# Patient Record
Sex: FEMALE | Race: WHITE | Employment: FULL TIME | URBAN - METROPOLITAN AREA
[De-identification: names, ages, dates, MRNs, and addresses within clinical notes are randomized per-mention and may not be internally consistent; named-entity substitution may affect disease eponyms.]

---

## 2017-03-10 ENCOUNTER — OFFICE VISIT (OUTPATIENT)
Dept: FAMILY MEDICINE | Facility: CLINIC | Age: 37
End: 2017-03-10
Payer: COMMERCIAL

## 2017-03-10 DIAGNOSIS — L40.9 PSORIASIS: Primary | ICD-10-CM

## 2017-03-10 PROCEDURE — 99213 OFFICE O/P EST LOW 20 MIN: CPT | Performed by: FAMILY MEDICINE

## 2017-03-10 RX ORDER — FLUOCINONIDE 0.5 MG/G
OINTMENT TOPICAL 2 TIMES DAILY
COMMUNITY
End: 2018-02-02

## 2017-03-10 RX ORDER — FLUOCINONIDE 0.5 MG/G
OINTMENT TOPICAL
Qty: 30 G | Refills: 1 | Status: SHIPPED | OUTPATIENT
Start: 2017-03-10 | End: 2018-02-02

## 2017-03-10 NOTE — PROGRESS NOTES
[facility] - PRIMARY CARE SKIN    CC : Psoriasis   SUBJECTIVE:                                                    Isaura Bhagat is a 36 year old female who presents to clinic today because of psoriasis on the elbows and knees. She denies any other areas of involvement. She denies any molluscum lesions today. Scaling and plaque formation are worse in the winter time.    Current treatment : Fluocinonide 0.05% ointment : a 30 gram tube has been suitable for satisfactory control over the last few months.  Response to treatment : Symptoms well controlled    Personal Medical History  Skin Cancer : NO  Eczema Psoriasis Rosacea Sensitive Skin Autoimmune   NO YES NO NO NO     Family Medical History  Skin Cancer : NO  Eczema Psoriasis Rosacea Sensitive Skin Autoimmune   NO NO NO NO NO       Patient Active Problem List   Diagnosis     Psoriasis     CARDIOVASCULAR SCREENING; LDL GOAL LESS THAN 160     Coagulopathy (H)     Rotator cuff tendonitis, right     Shoulder pain, right       Past Medical History   Diagnosis Date     Psoriasis 3/24/2010     Rotator cuff tendonitis, right 10/7/2016    Past Surgical History   Procedure Laterality Date     No history of surgery       Gyn surgery  11/72009     Csection     Laparoscopic tubal ligation Bilateral 1/30/2015     Procedure: LAPAROSCOPIC TUBAL LIGATION;  Surgeon: Reyes Poe MD;  Location:  OR      Social History   Substance Use Topics     Smoking status: Never Smoker     Smokeless tobacco: Never Used     Alcohol use Yes      Comment: occasionally    Family History     Problem (# of Occurrences) Relation (Name,Age of Onset)    Alzheimer Disease (1) Paternal Grandfather    C.A.D. (1) Maternal Grandfather    CEREBROVASCULAR DISEASE (1) Paternal Grandfather    Cardiovascular (1) Maternal Grandfather    Eye Disorder (1) Mother    GASTROINTESTINAL DISEASE (3) Mother, Father, Brother    HEART DISEASE (1) Maternal Grandfather    Hypertension (2) Father, Maternal  "Grandfather    Lipids (3) Maternal Grandfather, Paternal Grandmother, Paternal Grandfather    Obesity (1) Maternal Grandmother    Respiratory (1) Maternal Grandmother           Prescription Medications as of 3/10/2017             fluocinonide (LIDEX) 0.05 % ointment Apply topically 2 times daily    fluocinonide (LIDEX) 0.05 % ointment Apply to affected areas on elbows and knees when needed    drospirenone-ethinyl estradiol (YAQUELIN) 3-0.02 MG per tablet Take 1 tablet by mouth daily            Allergies   Allergen Reactions     Nkda [No Known Drug Allergies]         INTEGUMENTARY/SKIN: POSITIVE for scaling  ROS : 14 point review of systems was negative except the symptoms listed above in the HPI.    This document serves as a record of the services and decisions personally performed and made by Jennifer Watson MD. It was created on her behalf by Mike Enamorado, a trained medical scribe.  The creation of this document is based on the scribe's personal observations and the provider's statements to the medical scribe.  Mike Enamorado, March 10, 2017 3:41 PM      OBJECTIVE:                                                    GENERAL: healthy, alert and no distress  SKIN: Cronin Skin Type - II.  Face, Neck and Arms were examined. The dermatoscope was used to help evaluate pigmented lesions.  Skin Pertinent Findings:  Left and right elbow : 1 cm, slightly erythematous, light scaly plaque.    Diagnostic Test Results:  none           ASSESSMENT:                                                      Encounter Diagnosis   Name Primary?     Psoriasis Yes         PLAN:                                                    Patient Instructions   FUTURE APPOINTMENTS  Follow up in 1 year(s) for a re-evaluation of psoriasis.    TOPICAL STEROID INSTRUCTIONS  Fluocinonide 0.05% ointment.    Apply an amount equal to half of a fingertip (0.25 g) to the affected area(s) on the elbows and knees, two times per day for 3-5 days when needed.    \"Fingertip " "Amount\"      Not to be used on face or groin.      After initial treatment, topical steroid may be used as needed for flare-ups. However, do not use for more than 7 consecutive days.     Topical steroid use is for short-term treatment only. If after initial treatment, you are using this for prolonged periods of time, return to clinic for re-evaluation of treatment.    Keep in mind to also regularly use moisturizer, as this preventative measure can help maintain your skin's natural moisture barrier.        PROCEDURES:                                                    None.    TT : 20 minutes.  CT : 15 minutes. Discussion regarding etiology, spectrum of psoriasis, treatment options, aggravating factors.      The information in this document, created by the medical scribe for me, accurately reflects the services I personally performed and the decisions made by me. I have reviewed and approved this document for accuracy prior to leaving the patient care area.  Jennifer Watson MD March 10, 2017 3:41 PM  [facility] - PRIMARY CARE SKIN  "

## 2017-03-10 NOTE — MR AVS SNAPSHOT
"              After Visit Summary   3/10/2017    Isaura Bhagat    MRN: [id number]           Patient Information     Date Of Birth          1980        Visit Information        Provider Department      3/10/2017 3:40 PM Wandy Watson MD [facility] - Primary Care Skin        Today's Diagnoses     Psoriasis    -  1      Care Instructions    FUTURE APPOINTMENTS  Follow up in 1 year(s) for a re-evaluation of psoriasis.    TOPICAL STEROID INSTRUCTIONS  Fluocinonide 0.05% ointment.    Apply an amount equal to half of a fingertip (0.25 g) to the affected area(s) on the elbows and knees, two times per day for 3-5 days when needed.    \"Fingertip Amount\"      Not to be used on face or groin.      After initial treatment, topical steroid may be used as needed for flare-ups. However, do not use for more than 7 consecutive days.     Topical steroid use is for short-term treatment only. If after initial treatment, you are using this for prolonged periods of time, return to clinic for re-evaluation of treatment.    Keep in mind to also regularly use moisturizer, as this preventative measure can help maintain your skin's natural moisture barrier.        Follow-ups after your visit        Who to contact     If you have questions or need follow up information about today's clinic visit or your schedule please contact [facility] - PRIMARY CARE SKIN directly at [phone].  Normal or non-critical lab and imaging results will be communicated to you by MyChart, letter or phone within 4 business days after the clinic has received the results. If you do not hear from us within 7 days, please contact the clinic through MyChart or phone. If you have a critical or abnormal lab result, we will notify you by phone as soon as possible.  Submit refill requests through [employer] or call your pharmacy and they will forward the refill request to us. Please allow 3 business days for your refill to be completed.    " "      Additional Information About Your Visit        MyChart Information     [employer] lets you send messages to your doctor, view your test results, renew your prescriptions, schedule appointments and more. To sign up, go to [email or web address] . Click on \"Log in\" on the left side of the screen, which will take you to the Welcome page. Then click on \"Sign up Now\" on the right side of the page.     You will be asked to enter the access code listed below, as well as some personal information. Please follow the directions to create your username and password.     Your access code is: [email or web address]  Expires: 2017  3:44 PM     Your access code will  in 90 days. If you need help or a new code, please call your [facility] clinic or [phone].        Care EveryWhere ID     This is your Care EveryWhere ID. This could be used by other organizations to access your [facility] medical records  [id number]         Blood Pressure from Last 3 Encounters:   10/04/16 105/66   05/18/15 108/72   01/30/15 106/63    Weight from Last 3 Encounters:   10/04/16 119 lb (54 kg)   05/18/15 119 lb 4.8 oz (54.1 kg)   01/30/15 116 lb 3.2 oz (52.7 kg)              Today, you had the following     No orders found for display         Today's Medication Changes          These changes are accurate as of: 3/10/17  3:44 PM.  If you have any questions, ask your nurse or doctor.               These medicines have changed or have updated prescriptions.        Dose/Directions    * fluocinonide 0.05 % ointment   Commonly known as:  LIDEX   This may have changed:  Another medication with the same name was added. Make sure you understand how and when to take each.        Apply topically 2 times daily   Refills:  0       * fluocinonide 0.05 % ointment   Commonly known as:  LIDEX   This may have changed:  You were already taking a medication with the same name, and this prescription was added. Make sure you understand how and when to take each.   Used " for:  Psoriasis        Apply to affected areas on elbows and knees when needed   Quantity:  30 g   Refills:  1       * Notice:  This list has 2 medication(s) that are the same as other medications prescribed for you. Read the directions carefully, and ask your doctor or other care provider to review them with you.         Where to get your medicines      These medications were sent to [place] [address] [facility] - [place], [place] - [address] [facility]  [address] [address]     Phone:  [phone]     fluocinonide 0.05 % ointment                Primary Care Provider Office Phone # Fax #    Kisha Guthrie [phone]       [place] [address] [address] [address]        Thank you!     Thank you for choosing [facility] - PRIMARY CARE SKIN  for your care. Our goal is always to provide you with excellent care. Hearing back from our patients is one way we can continue to improve our services. Please take a few minutes to complete the written survey that you may receive in the mail after your visit with us. Thank you!             Your Updated Medication List - Protect others around you: Learn how to safely use, store and throw away your medicines at www.disposemymeds.org.          This list is accurate as of: 3/10/17  3:44 PM.  Always use your most recent med list.                   Brand Name Dispense Instructions for use    drospirenone-ethinyl estradiol 3-0.02 MG per tablet    YAQUELIN     Take 1 tablet by mouth daily       * fluocinonide 0.05 % ointment    LIDEX     Apply topically 2 times daily       * fluocinonide 0.05 % ointment    LIDEX    30 g    Apply to affected areas on elbows and knees when needed       * Notice:  This list has 2 medication(s) that are the same as other medications prescribed for you. Read the directions carefully, and ask your doctor or other care provider to review them with you.

## 2017-03-10 NOTE — PATIENT INSTRUCTIONS
"FUTURE APPOINTMENTS  Follow up in 1 year(s) for a re-evaluation of psoriasis.    TOPICAL STEROID INSTRUCTIONS  Fluocinonide 0.05% ointment.    Apply an amount equal to half of a fingertip (0.25 g) to the affected area(s) on the elbows and knees, two times per day for 3-5 days when needed.    \"Fingertip Amount\"      Not to be used on face or groin.      After initial treatment, topical steroid may be used as needed for flare-ups. However, do not use for more than 7 consecutive days.     Topical steroid use is for short-term treatment only. If after initial treatment, you are using this for prolonged periods of time, return to clinic for re-evaluation of treatment.    Keep in mind to also regularly use moisturizer, as this preventative measure can help maintain your skin's natural moisture barrier.  "

## 2018-02-02 ENCOUNTER — OFFICE VISIT (OUTPATIENT)
Dept: FAMILY MEDICINE | Facility: CLINIC | Age: 38
End: 2018-02-02
Payer: COMMERCIAL

## 2018-02-02 DIAGNOSIS — M25.50 ARTHRALGIA, UNSPECIFIED JOINT: ICD-10-CM

## 2018-02-02 DIAGNOSIS — L40.0 PLAQUE PSORIASIS: Primary | ICD-10-CM

## 2018-02-02 LAB
BASOPHILS # BLD AUTO: 0 10E9/L (ref 0–0.2)
BASOPHILS NFR BLD AUTO: 0.4 %
CRP SERPL-MCNC: <2.9 MG/L (ref 0–8)
DIFFERENTIAL METHOD BLD: NORMAL
EOSINOPHIL # BLD AUTO: 0.2 10E9/L (ref 0–0.7)
EOSINOPHIL NFR BLD AUTO: 2.6 %
ERYTHROCYTE [DISTWIDTH] IN BLOOD BY AUTOMATED COUNT: 13.2 % (ref 10–15)
HCT VFR BLD AUTO: 42.7 % (ref 35–47)
HGB BLD-MCNC: 14.3 G/DL (ref 11.7–15.7)
LYMPHOCYTES # BLD AUTO: 1.5 10E9/L (ref 0.8–5.3)
LYMPHOCYTES NFR BLD AUTO: 22.5 %
MCH RBC QN AUTO: 29 PG (ref 26.5–33)
MCHC RBC AUTO-ENTMCNC: 33.5 G/DL (ref 31.5–36.5)
MCV RBC AUTO: 87 FL (ref 78–100)
MONOCYTES # BLD AUTO: 0.4 10E9/L (ref 0–1.3)
MONOCYTES NFR BLD AUTO: 6 %
NEUTROPHILS # BLD AUTO: 4.7 10E9/L (ref 1.6–8.3)
NEUTROPHILS NFR BLD AUTO: 68.5 %
PLATELET # BLD AUTO: 267 10E9/L (ref 150–450)
RBC # BLD AUTO: 4.93 10E12/L (ref 3.8–5.2)
WBC # BLD AUTO: 6.8 10E9/L (ref 4–11)

## 2018-02-02 PROCEDURE — 86038 ANTINUCLEAR ANTIBODIES: CPT | Performed by: FAMILY MEDICINE

## 2018-02-02 PROCEDURE — 86140 C-REACTIVE PROTEIN: CPT | Performed by: FAMILY MEDICINE

## 2018-02-02 PROCEDURE — 99214 OFFICE O/P EST MOD 30 MIN: CPT | Performed by: FAMILY MEDICINE

## 2018-02-02 PROCEDURE — 86431 RHEUMATOID FACTOR QUANT: CPT | Performed by: FAMILY MEDICINE

## 2018-02-02 PROCEDURE — 36415 COLL VENOUS BLD VENIPUNCTURE: CPT | Performed by: FAMILY MEDICINE

## 2018-02-02 PROCEDURE — 85025 COMPLETE CBC W/AUTO DIFF WBC: CPT | Performed by: FAMILY MEDICINE

## 2018-02-02 PROCEDURE — 80053 COMPREHEN METABOLIC PANEL: CPT | Performed by: FAMILY MEDICINE

## 2018-02-02 RX ORDER — BETAMETHASONE DIPROPIONATE 0.5 MG/G
OINTMENT, AUGMENTED TOPICAL 2 TIMES DAILY
Qty: 50 G | Refills: 1 | Status: SHIPPED | OUTPATIENT
Start: 2018-02-02 | End: 2020-01-07

## 2018-02-02 RX ORDER — CALCIPOTRIENE 50 UG/G
OINTMENT TOPICAL
Qty: 100 G | Refills: 3 | Status: SHIPPED | OUTPATIENT
Start: 2018-02-02 | End: 2019-03-12

## 2018-02-02 RX ORDER — CYCLOSPORINE 0.5 MG/ML
EMULSION OPHTHALMIC
Refills: 3 | COMMUNITY
Start: 2018-01-05 | End: 2019-03-12

## 2018-02-02 RX ORDER — NORETHINDRONE ACETATE/ETHINYL ESTRADIOL AND FERROUS FUMARATE 1.5-30(21)
KIT ORAL DAILY
COMMUNITY
Start: 2018-01-13 | End: 2019-08-07

## 2018-02-02 NOTE — MR AVS SNAPSHOT
"              After Visit Summary   2/2/2018    Isaura Bhagat    MRN: [id number]           Patient Information     Date Of Birth          1980        Visit Information        Provider Department      2/2/2018 10:40 AM Wandy Watson MD [facility] [place]        Today's Diagnoses     Plaque psoriasis    -  1    Arthralgia, unspecified joint          Care Instructions    FUTURE APPOINTMENTS    Follow up in 3 month(s).    Follow up with rheumatology for evaluation of arthralgias.    TOPICAL STEROID INSTRUCTIONS  Augmented betamethasone dipropionate 0.05% ointment.    Apply an amount equal to half of a fingertip (0.25 g) to the affected area(s) on the knees and elbows, one time per day alternating for one week on, then pause application for one week.    \"Fingertip Amount\"      Not to be used on face or groin.    Topical steroid use is for short-term treatment only. If after initial treatment, you are using this for prolonged periods of time, return to clinic for re-evaluation of treatment.    Keep in mind to also regularly use moisturizer, as this preventative measure can help maintain your skin's natural moisture barrier.    TOPICAL MEDICATION INSTRUCTIONS  Calcipotriene 0.005% ointment    Apply a thin layer to the affected area(s) on the knees and elbows 1-2 times per day for every day.    This is not a steroid.    Keep in mind to also regularly use moisturizer, as this preventative measure can help maintain your skin's natural moisture barrier.          Follow-ups after your visit        Who to contact     If you have questions or need follow up information about today's clinic visit or your schedule please contact [facility] [place] directly at [phone].  Normal or non-critical lab and imaging results will be communicated to you by MyChart, letter or phone within 4 business days after the clinic has received the results. If you do not hear from us within 7 days, " "please contact the clinic through [employer] or phone. If you have a critical or abnormal lab result, we will notify you by phone as soon as possible.  Submit refill requests through [employer] or call your pharmacy and they will forward the refill request to us. Please allow 3 business days for your refill to be completed.          Additional Information About Your Visit        [employer] Information     [employer] lets you send messages to your doctor, view your test results, renew your prescriptions, schedule appointments and more. To sign up, go to [email or web address] . Click on \"Log in\" on the left side of the screen, which will take you to the Welcome page. Then click on \"Sign up Now\" on the right side of the page.     You will be asked to enter the access code listed below, as well as some personal information. Please follow the directions to create your username and password.     Your access code is: [email or web address]  Expires: 5/3/2018 10:49 AM     Your access code will  in 90 days. If you need help or a new code, please call your [facility] clinic or [phone].        Care EveryWhere ID     This is your Care EveryWhere ID. This could be used by other organizations to access your [facility] medical records  [id number]         Blood Pressure from Last 3 Encounters:   10/04/16 105/66   05/18/15 108/72   01/30/15 106/63    Weight from Last 3 Encounters:   10/04/16 119 lb (54 kg)   05/18/15 119 lb 4.8 oz (54.1 kg)   01/30/15 116 lb 3.2 oz (52.7 kg)              Today, you had the following     No orders found for display         Today's Medication Changes          These changes are accurate as of 18 10:49 AM.  If you have any questions, ask your nurse or doctor.               Stop taking these medicines if you haven't already. Please contact your care team if you have questions.     fluocinonide 0.05 % ointment   Commonly known as:  LIDEX   Stopped by:  Wandy Watson MD                    Primary " Care Provider    [facility] MD [place]       No address on file        Equal Access to Services     KASHIFJER IRVIN : Hadii aad ku hadsandraalfonso Holley, wacolemoon godoy, landylance acevesmarama fitzgerald. So [facility] [phone].    ATENCIÓN: Si habla español, tiene a lomeli disposición servicios gratuitos de asistencia lingüística. Llame al [phone].    We comply with applicable federal civil rights laws and [place] laws. We do not discriminate on the basis of race, color, national origin, age, disability, sex, sexual orientation, or gender identity.            Thank you!     Thank you for choosing [facility] [place]  for your care. Our goal is always to provide you with excellent care. Hearing back from our patients is one way we can continue to improve our services. Please take a few minutes to complete the written survey that you may receive in the mail after your visit with us. Thank you!             Your Updated Medication List - Protect others around you: Learn how to safely use, store and throw away your medicines at www.disposemymeds.org.          This list is accurate as of 2/2/18 10:49 AM.  Always use your most recent med list.                   Brand Name Dispense Instructions for use Diagnosis    RIVERA FE 1.5/30 1.5-30 MG-MCG per tablet   Generic drug:  norethindrone-ethinyl estradiol-iron      Take by mouth daily        RESTASIS 0.05 % ophthalmic emulsion   Generic drug:  cycloSPORINE      INSTILL ONE DROP INTO EACH EYE EVERY 12 HOURS

## 2018-02-02 NOTE — PROGRESS NOTES
[facility] - PRIMARY CARE SKIN    CC : Psoriasis   SUBJECTIVE:                                                    Isaura Bhagat is a 37 year old female who presents to clinic today for follow-up of psoriasis on the elbows and left knee. Scaling and itchiness have continued on the left knee. Itchiness has intensified, and she is waking at night due to itchiness. The affected area may be enlarging and she is concerned about possible skin atrophy.     She is also beginning to experience scaling and itchiness symptoms on the right knee. Elbows are well controlled at this time. She denies other areas of involvement.    She is concerned about the use of high compression socks ending at the knee as an aggravating factor. Scaling and itchiness continue to be worse in the winter.    Left knee pain and bilateral knuckle pain in beginning in winter 2017 has persisted since Nov 2017. She denies swelling of knuckles, with the exception of the left third finger. Morning stiffness has been a persistent issue as well, typically resolving after 1 hour.      Current treatment : Fluocinonide 0.05% ointment used a couple of times a week.  Response to treatment : Control of symptoms has diminished,.    Personal Medical History  Skin Cancer : NO  Eczema Psoriasis Rosacea Sensitive Skin Autoimmune   NO YES NO NO NO     Family Medical History  Skin Cancer : NO  Eczema Psoriasis Rosacea Sensitive Skin Autoimmune   NO NO NO NO NO   Arthritis : YES.    Patient Active Problem List   Diagnosis     Psoriasis     CARDIOVASCULAR SCREENING; LDL GOAL LESS THAN 160     Coagulopathy (H)     Rotator cuff tendonitis, right     Shoulder pain, right       Past Medical History:   Diagnosis Date     Psoriasis 3/24/2010     Rotator cuff tendonitis, right 10/7/2016    Past Surgical History:   Procedure Laterality Date     GYN SURGERY  11/72009    Csection     LAPAROSCOPIC TUBAL LIGATION Bilateral 1/30/2015    Procedure: LAPAROSCOPIC TUBAL  LIGATION;  Surgeon: Reyes Poe MD;  Location: [facility] OR     NO HISTORY OF SURGERY        Social History   Substance Use Topics     Smoking status: Never Smoker     Smokeless tobacco: Never Used     Alcohol use Yes      Comment: occasionally    Family History     Problem (# of Occurrences) Relation (Name,Age of Onset)    Alzheimer Disease (1) Paternal Grandfather    C.A.D. (1) Maternal Grandfather    CEREBROVASCULAR DISEASE (1) Paternal Grandfather    Cardiovascular (1) Maternal Grandfather    Eye Disorder (1) Mother    GASTROINTESTINAL DISEASE (3) Mother, Father, Brother    HEART DISEASE (1) Maternal Grandfather    Hypertension (2) Father, Maternal Grandfather    Lipids (3) Maternal Grandfather, Paternal Grandmother, Paternal Grandfather    Obesity (1) Maternal Grandmother    Respiratory (1) Maternal Grandmother           Prescription Medications as of 2/2/2018             RIVERA FE 1.5/30 1.5-30 MG-MCG per tablet Take by mouth daily    RESTASIS 0.05 % ophthalmic emulsion INSTILL ONE DROP INTO EACH EYE EVERY 12 HOURS            Allergies   Allergen Reactions     Nkda [No Known Drug Allergies]         INTEGUMENTARY/SKIN: POSITIVE for pruritus. NEGATIVE for scaling.  MUSCULOSKELETAL: POSITIVE  for arthralgias  ROS : 14 point review of systems was negative except the symptoms listed above in the HPI.    This document serves as a record of the services and decisions personally performed and made by Jennifer Watson MD. It was created on her behalf by Mike Enamorado, a trained medical scribe.  The creation of this document is based on the scribe's personal observations and the provider's statements to the medical scribe.  Mike Enamorado, February 2, 2018 10:32 AM      OBJECTIVE:                                                    GENERAL: healthy, alert and no distress  SKIN: Cronin Skin Type - II.  Arms, legs, fingers were examined. The dermatoscope was used to help evaluate pigmented lesions.  Skin Pertinent Findings:  Left  "knee : 5 x 2 cm erythematous plaque with no scaling. No skin atrophy.    Right anterior knee : 1 cm plaque    Elbows : erythematous plaques    Fingernails : No pitting.    Diagnostic Test Results:  No results found for this or any previous visit (from the past 24 hour(s)).    MDM : because of the new arthralgias recommended rheumatology consult regarding psoriatic arthritis    ASSESSMENT:                                                      Encounter Diagnoses   Name Primary?     Plaque psoriasis Yes     Arthralgia, unspecified joint          PLAN:                                                    Patient Instructions   FUTURE APPOINTMENTS    Follow up in 3 month(s).    Follow up with rheumatology for evaluation of arthralgias.    TOPICAL STEROID INSTRUCTIONS  Augmented betamethasone dipropionate 0.05% ointment.    Apply an amount equal to half of a fingertip (0.25 g) to the affected area(s) on the knees and elbows, one time per day alternating for one week on, then pause application for one week.    \"Fingertip Amount\"      Not to be used on face or groin.    Topical steroid use is for short-term treatment only. If after initial treatment, you are using this for prolonged periods of time, return to clinic for re-evaluation of treatment.    Keep in mind to also regularly use moisturizer, as this preventative measure can help maintain your skin's natural moisture barrier.    TOPICAL MEDICATION INSTRUCTIONS  Calcipotriene 0.005% ointment    Apply a thin layer to the affected area(s) on the knees and elbows 1-2 times per day for every day.    This is not a steroid.    Keep in mind to also regularly use moisturizer, as this preventative measure can help maintain your skin's natural moisture barrier.        PROCEDURES:                                                    None.    TT : 20 minutes.  CT : 15 minutes. Discussion regarding etiology, spectrum of psoriasis, treatment options, aggravating factors.      The " information in this document, created by the medical scribe for me, accurately reflects the services I personally performed and the decisions made by me. I have reviewed and approved this document for accuracy prior to leaving the patient care area.  Jennifer Watson MD February 2, 2018 10:32 AM  [facility] - PRIMARY CARE SKIN

## 2018-02-02 NOTE — PATIENT INSTRUCTIONS
"FUTURE APPOINTMENTS    Follow up in 3 month(s).    Follow up with rheumatology for evaluation of arthralgias.    TOPICAL STEROID INSTRUCTIONS  Augmented betamethasone dipropionate 0.05% ointment.    Apply an amount equal to half of a fingertip (0.25 g) to the affected area(s) on the knees and elbows, one time per day alternating for one week on, then pause application for one week.    \"Fingertip Amount\"      Not to be used on face or groin.    Topical steroid use is for short-term treatment only. If after initial treatment, you are using this for prolonged periods of time, return to clinic for re-evaluation of treatment.    Keep in mind to also regularly use moisturizer, as this preventative measure can help maintain your skin's natural moisture barrier.    TOPICAL MEDICATION INSTRUCTIONS  Calcipotriene 0.005% ointment    Apply a thin layer to the affected area(s) on the knees and elbows 1-2 times per day for every day.    This is not a steroid.    Keep in mind to also regularly use moisturizer, as this preventative measure can help maintain your skin's natural moisture barrier.  "

## 2018-02-02 NOTE — Clinical Note
2/2/2018         RE: Isaura Bhagat  [address]  [place] [address]        Dear Colleague,    Thank you for referring your patient, Isaura Bhagat, to the [facility]. Please see a copy of my visit note below.    [facility] - PRIMARY CARE SKIN    CC : Psoriasis   SUBJECTIVE:                                                    Isaura Bhagat is a 37 year old female who presents to clinic today for follow-up of psoriasis on the elbows and left knee. Scaling and itchiness have continued on the left knee. Itchiness has intensified, and she is waking at night due to itchiness. The affected area may be enlarging and she is concerned about possible skin atrophy.     She is also beginning to experience scaling and itchiness symptoms on the right knee. Elbows are well controlled at this time. She denies other areas of involvement.    She is concerned about the use of high compression socks ending at the knee as an aggravating factor. Scaling and itchiness continue to be worse in the winter.    Left knee pain and bilateral knuckle pain in beginning in winter 2017 has persisted since Nov 2017. She denies swelling of knuckles, with the exception of the left third finger. Morning stiffness has been a persistent issue as well, typically resolving after 1 hour.      Current treatment : Fluocinonide 0.05% ointment used a couple of times a week.  Response to treatment : Control of symptoms has diminished,.    Personal Medical History  Skin Cancer : NO  Eczema Psoriasis Rosacea Sensitive Skin Autoimmune   NO YES NO NO NO     Family Medical History  Skin Cancer : NO  Eczema Psoriasis Rosacea Sensitive Skin Autoimmune   NO NO NO NO NO   Arthritis : YES.    Patient Active Problem List   Diagnosis     Psoriasis     CARDIOVASCULAR SCREENING; LDL GOAL LESS THAN 160     Coagulopathy (H)     Rotator cuff tendonitis, right     Shoulder pain, right       Past Medical History:   Diagnosis Date      Psoriasis 3/24/2010     Rotator cuff tendonitis, right 10/7/2016    Past Surgical History:   Procedure Laterality Date     GYN SURGERY  11/72009    Csection     LAPAROSCOPIC TUBAL LIGATION Bilateral 1/30/2015    Procedure: LAPAROSCOPIC TUBAL LIGATION;  Surgeon: Reyes Poe MD;  Location: [facility] OR     NO HISTORY OF SURGERY        Social History   Substance Use Topics     Smoking status: Never Smoker     Smokeless tobacco: Never Used     Alcohol use Yes      Comment: occasionally    Family History     Problem (# of Occurrences) Relation (Name,Age of Onset)    Alzheimer Disease (1) Paternal Grandfather    C.A.D. (1) Maternal Grandfather    CEREBROVASCULAR DISEASE (1) Paternal Grandfather    Cardiovascular (1) Maternal Grandfather    Eye Disorder (1) Mother    GASTROINTESTINAL DISEASE (3) Mother, Father, Brother    HEART DISEASE (1) Maternal Grandfather    Hypertension (2) Father, Maternal Grandfather    Lipids (3) Maternal Grandfather, Paternal Grandmother, Paternal Grandfather    Obesity (1) Maternal Grandmother    Respiratory (1) Maternal Grandmother           Prescription Medications as of 2/2/2018             RIVERA FE 1.5/30 1.5-30 MG-MCG per tablet Take by mouth daily    RESTASIS 0.05 % ophthalmic emulsion INSTILL ONE DROP INTO EACH EYE EVERY 12 HOURS            Allergies   Allergen Reactions     Nkda [No Known Drug Allergies]         INTEGUMENTARY/SKIN: POSITIVE for pruritus. NEGATIVE for scaling.  MUSCULOSKELETAL: POSITIVE  for arthralgias  ROS : 14 point review of systems was negative except the symptoms listed above in the HPI.    This document serves as a record of the services and decisions personally performed and made by Jennifer Watson MD. It was created on her behalf by Mike Enamorado, a trained medical scribe.  The creation of this document is based on the scribe's personal observations and the provider's statements to the medical scribe.  Mike Enamorado, February 2, 2018 10:32 AM      OBJECTIVE:            "                                         GENERAL: healthy, alert and no distress  SKIN: Cronin Skin Type - II.  Arms, legs, fingers were examined. The dermatoscope was used to help evaluate pigmented lesions.  Skin Pertinent Findings:  Left knee : 5 x 2 cm erythematous plaque with no scaling. No skin atrophy.    Right anterior knee : 1 cm plaque    Elbows : erythematous plaques    Fingernails : No pitting.    Diagnostic Test Results:  No results found for this or any previous visit (from the past 24 hour(s)).    MDM : because of the new arthralgias recommended rheumatology consult regarding psoriatic arthritis    ASSESSMENT:                                                      Encounter Diagnoses   Name Primary?     Plaque psoriasis Yes     Arthralgia, unspecified joint          PLAN:                                                    Patient Instructions   FUTURE APPOINTMENTS    Follow up in 3 month(s).    Follow up with rheumatology for evaluation of arthralgias.    TOPICAL STEROID INSTRUCTIONS  Augmented betamethasone dipropionate 0.05% ointment.    Apply an amount equal to half of a fingertip (0.25 g) to the affected area(s) on the knees and elbows, one time per day alternating for one week on, then pause application for one week.    \"Fingertip Amount\"      Not to be used on face or groin.    Topical steroid use is for short-term treatment only. If after initial treatment, you are using this for prolonged periods of time, return to clinic for re-evaluation of treatment.    Keep in mind to also regularly use moisturizer, as this preventative measure can help maintain your skin's natural moisture barrier.    TOPICAL MEDICATION INSTRUCTIONS  Calcipotriene 0.005% ointment    Apply a thin layer to the affected area(s) on the knees and elbows 1-2 times per day for every day.    This is not a steroid.    Keep in mind to also regularly use moisturizer, as this preventative measure can help maintain your skin's " natural moisture barrier.        PROCEDURES:                                                    None.    TT : 20 minutes.  CT : 15 minutes. Discussion regarding etiology, spectrum of psoriasis, treatment options, aggravating factors.      The information in this document, created by the medical scribe for me, accurately reflects the services I personally performed and the decisions made by me. I have reviewed and approved this document for accuracy prior to leaving the patient care area.  Jennifer Watson MD February 2, 2018 10:32 AM  [facility] - PRIMARY CARE SKIN    Again, thank you for allowing me to participate in the care of your patient.        Sincerely,        Wandy Watson MD

## 2018-02-03 LAB
ALBUMIN SERPL-MCNC: 3.8 G/DL (ref 3.4–5)
ALP SERPL-CCNC: 34 U/L (ref 40–150)
ALT SERPL W P-5'-P-CCNC: 17 U/L (ref 0–50)
ANION GAP SERPL CALCULATED.3IONS-SCNC: 6 MMOL/L (ref 3–14)
AST SERPL W P-5'-P-CCNC: 19 U/L (ref 0–45)
BILIRUB SERPL-MCNC: 0.6 MG/DL (ref 0.2–1.3)
BUN SERPL-MCNC: 12 MG/DL (ref 7–30)
CALCIUM SERPL-MCNC: 8.8 MG/DL (ref 8.5–10.1)
CHLORIDE SERPL-SCNC: 104 MMOL/L (ref 94–109)
CO2 SERPL-SCNC: 28 MMOL/L (ref 20–32)
CREAT SERPL-MCNC: 0.75 MG/DL (ref 0.52–1.04)
GFR SERPL CREATININE-BSD FRML MDRD: 86 ML/MIN/1.7M2
GLUCOSE SERPL-MCNC: 99 MG/DL (ref 70–99)
POTASSIUM SERPL-SCNC: 3.6 MMOL/L (ref 3.4–5.3)
PROT SERPL-MCNC: 7 G/DL (ref 6.8–8.8)
SODIUM SERPL-SCNC: 138 MMOL/L (ref 133–144)

## 2018-02-04 LAB — RHEUMATOID FACT SER NEPH-ACNC: <20 IU/ML (ref 0–20)

## 2018-02-05 LAB — ANA SER QL IF: NEGATIVE

## 2018-03-04 ENCOUNTER — HEALTH MAINTENANCE LETTER (OUTPATIENT)
Age: 38
End: 2018-03-04

## 2018-10-01 ENCOUNTER — TRANSFERRED RECORDS (OUTPATIENT)
Dept: HEALTH INFORMATION MANAGEMENT | Facility: CLINIC | Age: 38
End: 2018-10-01

## 2018-10-01 LAB — PAP SMEAR - HIM PATIENT REPORTED: NEGATIVE

## 2019-03-12 ENCOUNTER — OFFICE VISIT (OUTPATIENT)
Dept: FAMILY MEDICINE | Facility: CLINIC | Age: 39
End: 2019-03-12
Payer: COMMERCIAL

## 2019-03-12 VITALS
TEMPERATURE: 98.1 F | DIASTOLIC BLOOD PRESSURE: 72 MMHG | SYSTOLIC BLOOD PRESSURE: 100 MMHG | HEIGHT: 62 IN | OXYGEN SATURATION: 99 % | HEART RATE: 91 BPM | WEIGHT: 117 LBS | BODY MASS INDEX: 21.53 KG/M2

## 2019-03-12 DIAGNOSIS — H93.8X3 EAR FULLNESS, BILATERAL: Primary | ICD-10-CM

## 2019-03-12 PROCEDURE — 99213 OFFICE O/P EST LOW 20 MIN: CPT | Performed by: INTERNAL MEDICINE

## 2019-03-12 RX ORDER — ERGOCALCIFEROL (VITAMIN D2) 10 MCG
TABLET ORAL
COMMUNITY
End: 2019-08-07

## 2019-03-12 ASSESSMENT — MIFFLIN-ST. JEOR: SCORE: 1155.96

## 2019-03-12 NOTE — PROGRESS NOTES
SUBJECTIVE:   Isaura Bhagat is a 38 year old female who presents to clinic today for the following health issues:      Concern - ear fullness   Onset:  About one month     Description:    pt is having ear fullness     Intensity: mild    Progression of Symptoms:  worsening    Accompanying Signs & Symptoms:  Hearing concerns     Previous history of similar problem:   Yes   Therapies Tried and outcome: debrox       Problem list and histories reviewed & adjusted, as indicated.  Additional history: as documented    Patient Active Problem List   Diagnosis     Psoriasis     CARDIOVASCULAR SCREENING; LDL GOAL LESS THAN 160     Coagulopathy (H)     Rotator cuff tendonitis, right     Shoulder pain, right     Past Surgical History:   Procedure Laterality Date     GYN SURGERY  11/72009    Csection     LAPAROSCOPIC TUBAL LIGATION Bilateral 1/30/2015    Procedure: LAPAROSCOPIC TUBAL LIGATION;  Surgeon: Reyes Poe MD;  Location: [facility] OR     NO HISTORY OF SURGERY         Social History     Tobacco Use     Smoking status: Never Smoker     Smokeless tobacco: Never Used   Substance Use Topics     Alcohol use: Yes     Comment: occasionally     Family History   Problem Relation Age of Onset     Eye Disorder Mother      Gastrointestinal Disease Mother      Hypertension Father      Gastrointestinal Disease Father      Obesity Maternal Grandmother      Respiratory Maternal Grandmother      C.A.D. Maternal Grandfather      Hypertension Maternal Grandfather      Cardiovascular Maternal Grandfather      Heart Disease Maternal Grandfather      Lipids Maternal Grandfather      Lipids Paternal Grandmother      Cerebrovascular Disease Paternal Grandfather      Alzheimer Disease Paternal Grandfather      Lipids Paternal Grandfather      Gastrointestinal Disease Brother            Reviewed and updated as needed this visit by clinical staff       Reviewed and updated as needed this visit by Provider         ROS:  Constitutional,  "HEENT, cardiovascular, pulmonary, gi and gu systems are negative, except as otherwise noted.    OBJECTIVE:     /72   Pulse 91   Temp 98.1  F (36.7  C) (Oral)   Ht 1.57 m (5' 1.81\")   Wt 53.1 kg (117 lb)   LMP 02/12/2019   SpO2 99%   BMI 21.53 kg/m    Body mass index is 21.53 kg/m .  GENERAL: healthy, alert and no distress  HENT: ear canals and TM's normal, nose and mouth without ulcers or lesions  RESP: lungs clear to auscultation - no rales, rhonchi or wheezes  CV: regular rate and rhythm, normal S1 S2, no S3 or S4, no murmur, click or rub, no peripheral edema and peripheral pulses strong    Diagnostic Test Results:  none     ASSESSMENT/PLAN:     1. Ear fullness, bilateral  There is no wax in either ear, recommending audiology referral for tympanograms.   - AUDIOLOGY ADULT REFERRAL    Follow up as needed.     Nelly Dupree MD  [facility]        Please abstract the following data from this visit with this patient into the appropriate field in Epic:    Pap smear done on this date: 10/2018 (approximately), by this group: [facility]   results were normal       "

## 2019-03-12 NOTE — Clinical Note
Pap smear done on this date: 10/2018 (approximately), by this group: [facility]   results were normal

## 2019-04-25 DIAGNOSIS — H91.90 HEARING LOSS, UNSPECIFIED HEARING LOSS TYPE, UNSPECIFIED LATERALITY: Primary | ICD-10-CM

## 2019-04-26 ENCOUNTER — PRE VISIT (OUTPATIENT)
Dept: OTOLARYNGOLOGY | Facility: CLINIC | Age: 39
End: 2019-04-26

## 2019-05-23 ENCOUNTER — OFFICE VISIT (OUTPATIENT)
Dept: OTOLARYNGOLOGY | Facility: CLINIC | Age: 39
End: 2019-05-23
Attending: OTOLARYNGOLOGY
Payer: COMMERCIAL

## 2019-05-23 ENCOUNTER — OFFICE VISIT (OUTPATIENT)
Dept: AUDIOLOGY | Facility: CLINIC | Age: 39
End: 2019-05-23
Attending: OTOLARYNGOLOGY
Payer: COMMERCIAL

## 2019-05-23 VITALS
BODY MASS INDEX: 22.08 KG/M2 | HEIGHT: 62 IN | HEART RATE: 73 BPM | WEIGHT: 120 LBS | RESPIRATION RATE: 15 BRPM | DIASTOLIC BLOOD PRESSURE: 74 MMHG | TEMPERATURE: 98.1 F | SYSTOLIC BLOOD PRESSURE: 117 MMHG

## 2019-05-23 DIAGNOSIS — M26.609 TMJ (TEMPOROMANDIBULAR JOINT SYNDROME): ICD-10-CM

## 2019-05-23 DIAGNOSIS — H93.13 TINNITUS, BILATERAL: ICD-10-CM

## 2019-05-23 DIAGNOSIS — H91.90 HEARING LOSS, UNSPECIFIED HEARING LOSS TYPE, UNSPECIFIED LATERALITY: ICD-10-CM

## 2019-05-23 DIAGNOSIS — H92.03 OTALGIA, BILATERAL: Primary | ICD-10-CM

## 2019-05-23 DIAGNOSIS — H93.13 TINNITUS OF BOTH EARS: Primary | ICD-10-CM

## 2019-05-23 ASSESSMENT — MIFFLIN-ST. JEOR: SCORE: 1175.82

## 2019-05-23 ASSESSMENT — PAIN SCALES - GENERAL: PAINLEVEL: NO PAIN (0)

## 2019-05-23 NOTE — NURSING NOTE
"Chief Complaint   Patient presents with     Consult     Bilateral ear fullness      Blood pressure 117/74, pulse 73, temperature 98.1  F (36.7  C), resp. rate 15, height 1.58 m (5' 2.21\"), weight 54.4 kg (120 lb).    George Kinsey LPN    "

## 2019-05-23 NOTE — PROGRESS NOTES
AUDIOLOGY REPORT    SUMMARY: Audiology visit completed. See audiogram for results.      RECOMMENDATIONS: Follow-up with ENT.        Mayda Marinelli, CCC-A  Licensed Audiologist  [place] #[id number]

## 2019-05-23 NOTE — LETTER
5/23/2019       RE: Isaura Bhagat  [address] [place] [address]     Dear Colleague,    Thank you for referring your patient, Isaura Bhagat, to the [facility]ROAT at [facility]. Please see a copy of my visit note below.    The patient presents with a history of fullness in the ears bilaterally. The patient denies sinusitis, rhinitis, facial pain, nasal obstruction or purulent nasal discharge. The patient denies chronic or recurrent tonsillitis, chronic or recurrent pharyngitis. The patient denies ear infections, dizziness or tinnitus.  Her Audiogram and Tympanogram are reviewed with her and they demonstrate normal hearing bilaterally with 100% word recognition scores bilaterally and normal tympanograms.     This patient is seen in consultation at the request of Dr. Nelly Dupree.     All other systems were reviewed and they are either negative or they are not directly pertinent to this Otolaryngology examination.      Past Medical History:    Past Medical History:   Diagnosis Date     Psoriasis 3/24/2010     Rotator cuff tendonitis, right 10/7/2016       Past Surgical History:    Past Surgical History:   Procedure Laterality Date     GYN SURGERY  11/72009    Csection     LAPAROSCOPIC TUBAL LIGATION Bilateral 1/30/2015    Procedure: LAPAROSCOPIC TUBAL LIGATION;  Surgeon: Reyes Poe MD;  Location:  OR     NO HISTORY OF SURGERY         Medications:      Current Outpatient Medications:      calcium carbonate (OS-KYLE) 1500 (600 Ca) MG tablet, Take by mouth 2 times daily (with meals), Disp: , Rfl:      RIVERA FE 1.5/30 1.5-30 MG-MCG per tablet, Take by mouth daily, Disp: , Rfl:      Multiple Vitamin (MULTI-DAY PO), , Disp: , Rfl:      Vitamin D, Cholecalciferol, 400 units TABS, , Disp: , Rfl:     Allergies:    Nkda [no known drug allergies]    Physical Examination:    The patient is a well developed, well nourished female in no  apparent distress.  She is normocephalic, atraumatic with pupils equally round and reactive to light.    Oral Cavity Examination:  Normal mucosa with no masses or lesions  Nasal Examination: Normal mucosa with no masses or lesions  Ear Examination: Ear canals clear, tympanic membranes and middle ear spaces normal  Neurological Examination: Facial nerve function intact and symmetric  Integumentary Examination: No lesions on the skin of the head and neck  Neck Examination: No masses or lesions, no lymphadenopathy  Endocrine Examination: Normal thyroid examination  Temporomandibular Joint Examination: Malrotation of the temporomandibular joints bilaterally.     Assessment and Plan:    The patient presents with a history of bilateral ear fullness and tinnitus and evidence of temporomandibular joint disorder. The patient will be referred for a dental consultation for evaluation of temporomandibular joint disorder and she will be seen again as needed.     Dr. Nelly Dupree    Again, thank you for allowing me to participate in the care of your patient.      Sincerely,    Sandro Bay MD

## 2019-05-23 NOTE — PROGRESS NOTES
The patient presents with a history of fullness in the ears bilaterally. The patient denies sinusitis, rhinitis, facial pain, nasal obstruction or purulent nasal discharge. The patient denies chronic or recurrent tonsillitis, chronic or recurrent pharyngitis. The patient denies ear infections, dizziness or tinnitus.  Her Audiogram and Tympanogram are reviewed with her and they demonstrate normal hearing bilaterally with 100% word recognition scores bilaterally and normal tympanograms.     This patient is seen in consultation at the request of Dr. Nelly Dupree.     All other systems were reviewed and they are either negative or they are not directly pertinent to this Otolaryngology examination.      Past Medical History:    Past Medical History:   Diagnosis Date     Psoriasis 3/24/2010     Rotator cuff tendonitis, right 10/7/2016       Past Surgical History:    Past Surgical History:   Procedure Laterality Date     GYN SURGERY  11/72009    Csection     LAPAROSCOPIC TUBAL LIGATION Bilateral 1/30/2015    Procedure: LAPAROSCOPIC TUBAL LIGATION;  Surgeon: Reyes Poe MD;  Location:  OR     NO HISTORY OF SURGERY         Medications:      Current Outpatient Medications:      calcium carbonate (OS-KYLE) 1500 (600 Ca) MG tablet, Take by mouth 2 times daily (with meals), Disp: , Rfl:      RIVERA FE 1.5/30 1.5-30 MG-MCG per tablet, Take by mouth daily, Disp: , Rfl:      Multiple Vitamin (MULTI-DAY PO), , Disp: , Rfl:      Vitamin D, Cholecalciferol, 400 units TABS, , Disp: , Rfl:     Allergies:    Nkda [no known drug allergies]    Physical Examination:    The patient is a well developed, well nourished female in no apparent distress.  She is normocephalic, atraumatic with pupils equally round and reactive to light.    Oral Cavity Examination:  Normal mucosa with no masses or lesions  Nasal Examination: Normal mucosa with no masses or lesions  Ear Examination: Ear canals clear, tympanic membranes and middle ear spaces  normal  Neurological Examination: Facial nerve function intact and symmetric  Integumentary Examination: No lesions on the skin of the head and neck  Neck Examination: No masses or lesions, no lymphadenopathy  Endocrine Examination: Normal thyroid examination  Temporomandibular Joint Examination: Malrotation of the temporomandibular joints bilaterally.     Assessment and Plan:    The patient presents with a history of bilateral ear fullness and tinnitus and evidence of temporomandibular joint disorder. The patient will be referred for a dental consultation for evaluation of temporomandibular joint disorder and she will be seen again as needed.     Dr. Nelly Dupree

## 2019-05-23 NOTE — PATIENT INSTRUCTIONS
1.  You were seen in the ENT Clinic today by Dr. Bay.  If you have any questions or concerns after your appointment, please call [phone]. Press option #1 for scheduling related needs. Press option #3 for Nurse advice.    2.  Please schedule an appointment for the following:   - Dental - TMJ Consultation    3.  Plan is to return to clinic to see Dr. Bay AFTER completion of TMJ consultation.      Kimberlyn Chopra LPN  [facility] Otolaryngology  [phone]      The patient presents with a history of bilateral ear fullness and tinnitus and evidence of temporomandibular joint disorder. The patient will be referred for a dental consultation for evaluation of temporomandibular joint disorder and she will be seen again as needed.

## 2019-06-03 ENCOUNTER — OFFICE VISIT (OUTPATIENT)
Dept: URGENT CARE | Facility: URGENT CARE | Age: 39
End: 2019-06-03
Payer: COMMERCIAL

## 2019-06-03 VITALS
HEART RATE: 100 BPM | OXYGEN SATURATION: 99 % | TEMPERATURE: 100.2 F | SYSTOLIC BLOOD PRESSURE: 126 MMHG | DIASTOLIC BLOOD PRESSURE: 81 MMHG

## 2019-06-03 DIAGNOSIS — R07.0 THROAT PAIN: Primary | ICD-10-CM

## 2019-06-03 LAB
DEPRECATED S PYO AG THROAT QL EIA: NORMAL
SPECIMEN SOURCE: NORMAL

## 2019-06-03 PROCEDURE — 99213 OFFICE O/P EST LOW 20 MIN: CPT

## 2019-06-03 PROCEDURE — 87880 STREP A ASSAY W/OPTIC: CPT | Performed by: FAMILY MEDICINE

## 2019-06-03 PROCEDURE — 87081 CULTURE SCREEN ONLY: CPT | Performed by: FAMILY MEDICINE

## 2019-06-03 ASSESSMENT — ENCOUNTER SYMPTOMS
VOMITING: 0
DIARRHEA: 0
SHORTNESS OF BREATH: 0
NAUSEA: 0
CHILLS: 0
HEADACHES: 0
RHINORRHEA: 1
FEVER: 0
COUGH: 1
WOUND: 0
SORE THROAT: 1

## 2019-06-03 NOTE — PROGRESS NOTES
SUBJECTIVE:   Isaura Bhagat is a 38 year old female presenting with a chief complaint of   Chief Complaint   Patient presents with     Urgent Care     Pharyngitis     sore throat,hurts to swallow,body aches,dizzy.     Sore throat, body aches and fatigue over the past 24 hours. Does mild rhinorrhea and cough.     Low grade temp   Hx of bruising after pregnancy. Denies other hx of bleeding.    Son has had a sore throat over the weekend     Review of Systems   Constitutional: Negative for chills and fever.   HENT: Positive for rhinorrhea and sore throat. Negative for congestion and ear pain.    Respiratory: Positive for cough. Negative for shortness of breath.    Gastrointestinal: Negative for diarrhea, nausea and vomiting.   Skin: Negative for rash and wound.   Neurological: Negative for headaches.       Past Medical History:   Diagnosis Date     Bleeding disorder (H)      Gastroesophageal reflux disease      Hearing problem      Psoriasis 3/24/2010     Rotator cuff tendonitis, right 10/7/2016     Family History   Problem Relation Age of Onset     Eye Disorder Mother      Gastrointestinal Disease Mother      Hypertension Father      Gastrointestinal Disease Father      Obesity Maternal Grandmother      Respiratory Maternal Grandmother      C.A.D. Maternal Grandfather      Hypertension Maternal Grandfather      Cardiovascular Maternal Grandfather      Heart Disease Maternal Grandfather      Lipids Maternal Grandfather      Lipids Paternal Grandmother      Dementia Paternal Grandmother      Cerebrovascular Disease Paternal Grandfather      Alzheimer Disease Paternal Grandfather      Lipids Paternal Grandfather      Gastrointestinal Disease Brother      Current Outpatient Medications   Medication Sig Dispense Refill     calcium carbonate (OS-KYLE) 1500 (600 Ca) MG tablet Take by mouth 2 times daily (with meals)       RIVERA FE 1.5/30 1.5-30 MG-MCG per tablet Take by mouth daily       Multiple Vitamin (MULTI-DAY PO)         Vitamin D, Cholecalciferol, 400 units TABS        Social History     Tobacco Use     Smoking status: Never Smoker     Smokeless tobacco: Never Used   Substance Use Topics     Alcohol use: Not Currently     Comment: occasionally       OBJECTIVE  /81   Pulse 100   Temp 100.2  F (37.9  C) (Oral)   SpO2 99%     Physical Exam   Constitutional: She is oriented to person, place, and time.   HENT:   Head: Normocephalic and atraumatic.   Right Ear: External ear normal.   Left Ear: External ear normal.   Nose: Nose normal.   Mouth/Throat: Oropharynx is clear and moist. No oropharyngeal exudate.   Eyes: Pupils are equal, round, and reactive to light. Conjunctivae are normal. Right eye exhibits no discharge. Left eye exhibits no discharge. No scleral icterus.   Neck: Neck supple. No tracheal deviation present. No thyromegaly present.   Cardiovascular: Normal rate, regular rhythm, normal heart sounds and intact distal pulses. Exam reveals no gallop and no friction rub.   No murmur heard.  Pulmonary/Chest: Effort normal and breath sounds normal. No stridor. No respiratory distress. She has no wheezes. She has no rales. She exhibits no tenderness.   Abdominal: Soft. Bowel sounds are normal. She exhibits no distension and no mass. There is no tenderness. There is no rebound and no guarding.   Musculoskeletal: She exhibits no edema, tenderness or deformity.   Lymphadenopathy:     She has no cervical adenopathy.   Neurological: She is alert and oriented to person, place, and time. No cranial nerve deficit.   Skin: Skin is warm and dry. No rash noted. No erythema.   Psychiatric: Judgment normal.     Results for orders placed or performed in visit on 06/03/19   Rapid strep screen   Result Value Ref Range    Specimen Description Throat     Rapid Strep A Screen       NEGATIVE: No Group A streptococcal antigen detected by immunoassay, await culture report.      ASSESSMENT:  Viral URI with pharyngitis     PLAN:  Symptom  cares explained including throat spray and or tylenol.   The patient indicates understanding of these issues and agrees with the plan.   Patient educational/instructional material provided including reasons for follow-up   Dieter Sanchez MD

## 2019-06-03 NOTE — LETTER
[facility] U[facility]  [address]  [place] [address]  Phone: [phone]  Fax: [phone]    Serenity 3, 2019        Isaura Bhagat  [address] [address]          To whom it may concern:    RE: Isaura Diana Leola    Patient was seen and treated today at our clinic. Having ongoing viral respiratory symptoms and sore throat. She may consider staying home from work tomorrow to rest if not feeling well. Follow-up if symptoms persist or worsen.          Sincerely,        Dieter aSnchez MD

## 2019-06-04 LAB
BACTERIA SPEC CULT: NORMAL
SPECIMEN SOURCE: NORMAL

## 2019-07-09 ENCOUNTER — TRANSFERRED RECORDS (OUTPATIENT)
Dept: HEALTH INFORMATION MANAGEMENT | Facility: CLINIC | Age: 39
End: 2019-07-09

## 2019-07-22 ENCOUNTER — TELEPHONE (OUTPATIENT)
Dept: FAMILY MEDICINE | Facility: CLINIC | Age: 39
End: 2019-07-22

## 2019-07-22 DIAGNOSIS — Z13.220 SCREENING FOR HYPERLIPIDEMIA: Primary | ICD-10-CM

## 2019-07-22 DIAGNOSIS — Z13.1 SCREENING FOR DIABETES MELLITUS: ICD-10-CM

## 2019-07-22 NOTE — TELEPHONE ENCOUNTER
"Fasting lipids and glucose ordered. These are considered \"screening tests\". If patient desires additional blood work it may not be covered by insurance but I would be happy to order it for her.     "

## 2019-07-31 ENCOUNTER — TELEPHONE (OUTPATIENT)
Dept: OTOLARYNGOLOGY | Facility: CLINIC | Age: 39
End: 2019-07-31

## 2019-08-01 DIAGNOSIS — Z13.1 SCREENING FOR DIABETES MELLITUS: ICD-10-CM

## 2019-08-01 DIAGNOSIS — Z13.220 SCREENING FOR HYPERLIPIDEMIA: ICD-10-CM

## 2019-08-01 LAB
CHOLEST SERPL-MCNC: 166 MG/DL
GLUCOSE SERPL-MCNC: 84 MG/DL (ref 70–99)
HDLC SERPL-MCNC: 85 MG/DL
LDLC SERPL CALC-MCNC: 72 MG/DL
NONHDLC SERPL-MCNC: 81 MG/DL
TRIGL SERPL-MCNC: 43 MG/DL

## 2019-08-01 PROCEDURE — 80061 LIPID PANEL: CPT | Performed by: INTERNAL MEDICINE

## 2019-08-01 PROCEDURE — 36415 COLL VENOUS BLD VENIPUNCTURE: CPT | Performed by: INTERNAL MEDICINE

## 2019-08-01 PROCEDURE — 82947 ASSAY GLUCOSE BLOOD QUANT: CPT | Performed by: INTERNAL MEDICINE

## 2019-08-06 NOTE — TELEPHONE ENCOUNTER
[facility] Call Center    Phone Message    May a detailed message be left on voicemail: yes    Reason for Call: Other: Patient said she received a message form Duy to follow up with Phu in ENT. She said Phu referred her somewhere else so she will not be following up here with ENT.     Action Taken: Other: [facility]

## 2019-08-07 ENCOUNTER — OFFICE VISIT (OUTPATIENT)
Dept: FAMILY MEDICINE | Facility: CLINIC | Age: 39
End: 2019-08-07
Payer: COMMERCIAL

## 2019-08-07 VITALS
HEART RATE: 76 BPM | DIASTOLIC BLOOD PRESSURE: 72 MMHG | OXYGEN SATURATION: 99 % | TEMPERATURE: 97.2 F | HEIGHT: 62 IN | SYSTOLIC BLOOD PRESSURE: 110 MMHG | BODY MASS INDEX: 21.9 KG/M2 | WEIGHT: 119 LBS

## 2019-08-07 DIAGNOSIS — Z12.31 ENCOUNTER FOR SCREENING MAMMOGRAM FOR BREAST CANCER: ICD-10-CM

## 2019-08-07 DIAGNOSIS — Z00.00 ROUTINE GENERAL MEDICAL EXAMINATION AT A HEALTH CARE FACILITY: Primary | ICD-10-CM

## 2019-08-07 DIAGNOSIS — Z23 NEED FOR VACCINATION: ICD-10-CM

## 2019-08-07 PROCEDURE — 99396 PREV VISIT EST AGE 40-64: CPT | Mod: 25 | Performed by: INTERNAL MEDICINE

## 2019-08-07 PROCEDURE — 90471 IMMUNIZATION ADMIN: CPT | Performed by: INTERNAL MEDICINE

## 2019-08-07 PROCEDURE — 90714 TD VACC NO PRESV 7 YRS+ IM: CPT | Performed by: INTERNAL MEDICINE

## 2019-08-07 ASSESSMENT — MIFFLIN-ST. JEOR: SCORE: 1163.03

## 2019-08-07 NOTE — PROGRESS NOTES
SUBJECTIVE:   CC: Isaura Bhagat is an 39 year old woman who presents for preventive health visit.     Healthy Habits:    Do you get at least three servings of calcium containing foods daily (dairy, green leafy vegetables, etc.)? yes    Amount of exercise or daily activities, outside of work: 3-4 times a week    Problems taking medications regularly No    Medication side effects: No    Have you had an eye exam in the past two years? yes    Do you see a dentist twice per year? yes    Do you have sleep apnea, excessive snoring or daytime drowsiness?no        Today's PHQ-2 Score:   PHQ-2 ( 1999 [employer]) 8/4/2019 3/12/2019   Q1: Little interest or pleasure in doing things 0 0   Q2: Feeling down, depressed or hopeless 0 0   PHQ-2 Score 0 0   Q1: Little interest or pleasure in doing things Not at all -   Q2: Feeling down, depressed or hopeless Not at all -   PHQ-2 Score 0 -       Abuse: Current or Past(Physical, Sexual or Emotional)- No  Do you feel safe in your environment? Yes    Social History     Tobacco Use     Smoking status: Never Smoker     Smokeless tobacco: Never Used   Substance Use Topics     Alcohol use: Not Currently     Comment: occasionally     If you drink alcohol do you typically have >3 drinks per day or >7 drinks per week? No                     Reviewed orders with patient.  Reviewed health maintenance and updated orders accordingly - Yes  BP Readings from Last 3 Encounters:   08/07/19 110/72   06/03/19 126/81   05/23/19 117/74    Wt Readings from Last 3 Encounters:   08/07/19 54 kg (119 lb)   05/23/19 54.4 kg (120 lb)   03/12/19 53.1 kg (117 lb)                  Recent Labs   Lab Test 08/01/19  0910 02/02/18  1047 01/12/12  1900   LDL 72  --   --    HDL 85  --   --    TRIG 43  --   --    ALT  --  17  --    CR  --  0.75  --    GFRESTIMATED  --  86  --    GFRESTBLACK  --  >90  --    POTASSIUM  --  3.6  --    TSH  --   --  1.57        Mammogram Screening: Patient under age 50, mutual decision  "reflected in health maintenance.      Pertinent mammograms are reviewed under the imaging tab.  History of abnormal Pap smear: NO - age 30- 65 PAP every 3 years recommended     Reviewed and updated as needed this visit by clinical staff         Reviewed and updated as needed this visit by Provider            ROS:  CONSTITUTIONAL: NEGATIVE for fever, chills, change in weight  INTEGUMENTARU/SKIN: NEGATIVE for worrisome rashes, moles or lesions  EYES: NEGATIVE for vision changes or irritation  ENT: NEGATIVE for ear, mouth and throat problems  RESP: NEGATIVE for significant cough or SOB  BREAST: NEGATIVE for masses, tenderness or discharge  CV: NEGATIVE for chest pain, palpitations or peripheral edema  GI: NEGATIVE for nausea, abdominal pain, heartburn, or change in bowel habits  : NEGATIVE for unusual urinary or vaginal symptoms. Periods are regular.  MUSCULOSKELETAL: NEGATIVE for significant arthralgias or myalgia  NEURO: NEGATIVE for weakness, dizziness or paresthesias  PSYCHIATRIC: NEGATIVE for changes in mood or affect    OBJECTIVE:   /72 (BP Location: Left arm, Patient Position: Chair, Cuff Size: Adult Regular)   Pulse 76   Temp 97.2  F (36.2  C) (Tympanic)   Ht 1.575 m (5' 2\")   Wt 54 kg (119 lb)   LMP 07/28/2019   SpO2 99%   BMI 21.77 kg/m    EXAM:  GENERAL: healthy, alert and no distress  EYES: Eyes grossly normal to inspection, PERRL and conjunctivae and sclerae normal  HENT: ear canals and TM's normal, nose and mouth without ulcers or lesions  NECK: no adenopathy, no asymmetry, masses, or scars and thyroid normal to palpation  RESP: lungs clear to auscultation - no rales, rhonchi or wheezes  BREAST: normal without masses, tenderness or nipple discharge and no palpable axillary masses or adenopathy  CV: regular rate and rhythm, normal S1 S2, no S3 or S4, no murmur, click or rub, no peripheral edema and peripheral pulses strong  ABDOMEN: soft, nontender, no hepatosplenomegaly, no masses and bowel " "sounds normal  MS: no gross musculoskeletal defects noted, no edema  SKIN: no suspicious lesions or rashes  NEURO: Normal strength and tone, mentation intact and speech normal  PSYCH: mentation appears normal, affect normal/bright    Diagnostic Test Results:  Labs reviewed in Epic    ASSESSMENT/PLAN:   1. Routine general medical examination at a health care facility    2. Encounter for screening mammogram for breast cancer  - *MA Screening Digital Bilateral; Future    COUNSELING:   Reviewed preventive health counseling, as reflected in patient instructions       Regular exercise       Healthy diet/nutrition       Contraception    Estimated body mass index is 21.8 kg/m  as calculated from the following:    Height as of 5/23/19: 1.58 m (5' 2.21\").    Weight as of 5/23/19: 54.4 kg (120 lb).         reports that she has never smoked. She has never used smokeless tobacco.      Counseling Resources:  ATP IV Guidelines  Pooled Cohorts Equation Calculator  Breast Cancer Risk Calculator  FRAX Risk Assessment  ICSI Preventive Guidelines  Dietary Guidelines for Americans, 2010  USDA's MyPlate  ASA Prophylaxis  Lung CA Screening    Nelly Dupree MD  [facility]  "

## 2019-08-20 ENCOUNTER — HOSPITAL ENCOUNTER (OUTPATIENT)
Dept: MAMMOGRAPHY | Facility: CLINIC | Age: 39
Discharge: HOME | End: 2019-08-20
Attending: INTERNAL MEDICINE | Admitting: INTERNAL MEDICINE
Payer: COMMERCIAL

## 2019-08-20 DIAGNOSIS — Z12.31 VISIT FOR SCREENING MAMMOGRAM: ICD-10-CM

## 2019-08-20 DIAGNOSIS — Z12.31 ENCOUNTER FOR SCREENING MAMMOGRAM FOR BREAST CANCER: ICD-10-CM

## 2019-08-20 PROCEDURE — 77063 BREAST TOMOSYNTHESIS BI: CPT

## 2019-11-07 ENCOUNTER — HEALTH MAINTENANCE LETTER (OUTPATIENT)
Age: 39
End: 2019-11-07

## 2019-12-02 DIAGNOSIS — L40.0 PLAQUE PSORIASIS: ICD-10-CM

## 2019-12-02 RX ORDER — BETAMETHASONE DIPROPIONATE 0.5 MG/G
OINTMENT, AUGMENTED TOPICAL
Qty: 50 G | Refills: 1 | OUTPATIENT
Start: 2019-12-02

## 2019-12-02 NOTE — TELEPHONE ENCOUNTER
"  Requested Prescriptions   Pending Prescriptions Disp Refills     augmented betamethasone dipropionate (DIPROLENE-AF) 0.05 % external ointment [Pharmacy Med Name: BETAMETHASONE DP AUG 0.05% OIN] 50 g 1     Sig: APPLY TO AFFECTED AREA ON ELBOWS & KNEES TWICE DAILY MONDAY THRU FRIDAY EVERY OTHER WEEK       Topical Steroids and Nonsteroidals Protocol Failed - 12/2/2019  1:36 PM        Failed - Recent (12 mo) or future (30 days) visit within the authorizing provider's specialty     Patient has had an office visit with the authorizing provider or a provider within the authorizing providers department within the previous 12 mos or has a future within next 30 days. See \"Patient Info\" tab in inbasket, or \"Choose Columns\" in Meds & Orders section of the refill encounter.              Failed - Medication is active on med list        Passed - Patient is age 6 or older        Passed - Authorizing prescriber's most recent note related to this medication read.     If refill request is for ophthalmic use, please forward request to provider for approval.          Passed - High potency steroid not ordered        Last Written Prescription Date:  2/2/18  Last Fill Quantity: 50g,  # refills: 1   Last office visit: 8/7/2019 with prescribing provider:     Future Office Visit:      "

## 2020-01-07 ENCOUNTER — OFFICE VISIT (OUTPATIENT)
Dept: FAMILY MEDICINE | Facility: CLINIC | Age: 40
End: 2020-01-07
Payer: COMMERCIAL

## 2020-01-07 VITALS — DIASTOLIC BLOOD PRESSURE: 62 MMHG | SYSTOLIC BLOOD PRESSURE: 122 MMHG

## 2020-01-07 DIAGNOSIS — L40.0 PLAQUE PSORIASIS: ICD-10-CM

## 2020-01-07 PROCEDURE — 99213 OFFICE O/P EST LOW 20 MIN: CPT | Performed by: FAMILY MEDICINE

## 2020-01-07 RX ORDER — BETAMETHASONE DIPROPIONATE 0.5 MG/G
OINTMENT, AUGMENTED TOPICAL 2 TIMES DAILY
Qty: 50 G | Refills: 1 | Status: SHIPPED | OUTPATIENT
Start: 2020-01-07

## 2020-01-07 NOTE — PATIENT INSTRUCTIONS
FUTURE APPOINTMENTS  Follow up in 1 year(s).    Continue applying moisturizer regularly.  Continue taking a Vitamin D supplement every day    OVER-THE-COUNTER (OTC) SUPPLEMENTS  Take by mouth a supplement of Vitamin D3 9339-7235 IU/day.    TOPICAL STEROID INSTRUCTIONS  Continue using augmented betamethasone dipropionate (Diprolene) 0.05% ointment infrequently as needed for control of symptoms.

## 2020-01-07 NOTE — PROGRESS NOTES
[facility] - PRIMARY CARE SKIN    CC : Psoriasis   SUBJECTIVE:                                                    Isaura Bhagat is a 39 year old female who presents to clinic today for follow-up of psoriasis on the elbows and left knee.     Symptoms have been well controlled. She requests a refill of augmented betamethasone dipropionate 0.05% ointment on the elbows and knees. Calcipotriene was never picked up, because augmented betamethasone dipropionate provided satisfactory control of symptoms. She is using Diprolene approximately once weekly. Previous treatment with fluocinonide 0.05% ointment did not give her good control of symptoms.    She does not report any aching or sore joints at this time.    Personal Medical History  Skin Cancer : NO  Eczema Psoriasis Rosacea Sensitive Skin Autoimmune   NO YES NO NO NO     Family Medical History  Skin Cancer : NO  Eczema Psoriasis Rosacea Sensitive Skin Autoimmune   NO NO NO NO NO   Arthritis : YES.    Patient Active Problem List   Diagnosis     Psoriasis     CARDIOVASCULAR SCREENING; LDL GOAL LESS THAN 160     Coagulopathy (H)     Rotator cuff tendonitis, right     Shoulder pain, right       Past Medical History:   Diagnosis Date     Bleeding disorder (H)      Gastroesophageal reflux disease      Hearing problem      Psoriasis 3/24/2010     Rotator cuff tendonitis, right 10/7/2016    Past Surgical History:   Procedure Laterality Date     GYN SURGERY  11/72009    Csection     LAPAROSCOPIC TUBAL LIGATION Bilateral 1/30/2015    Procedure: LAPAROSCOPIC TUBAL LIGATION;  Surgeon: Reyes Poe MD;  Location: [facility] OR     NO HISTORY OF SURGERY        Social History     Tobacco Use     Smoking status: Never Smoker     Smokeless tobacco: Never Used   Substance Use Topics     Alcohol use: Not Currently     Comment: occasionally     Drug use: Never    Family History     Problem (# of Occurrences) Relation (Name,Age of Onset)    Alzheimer Disease (1) Paternal  Grandfather    C.A.D. (1) Maternal Grandfather    Cardiovascular (1) Maternal Grandfather    Cerebrovascular Disease (1) Paternal Grandfather    Dementia (1) Paternal Grandmother    Eye Disorder (1) Mother    Gastrointestinal Disease (3) Mother, Father, Brother    Heart Disease (1) Maternal Grandfather    Hypertension (2) Father, Maternal Grandfather    Lipids (3) Maternal Grandfather, Paternal Grandmother, Paternal Grandfather    Obesity (1) Maternal Grandmother    Respiratory (1) Maternal Grandmother           Prescription Medications as of 1/7/2020       Rx Number Disp Refills Start End Last Dispensed Date Next Fill Date Owning Pharmacy    augmented betamethasone dipropionate (DIPROLENE-AF) 0.05 % external ointment  50 g 1 1/7/2020    [facility] [id number] [place] [address]    Sig: Apply topically 2 times daily Apply to AA on elbows and knees M-F every other week    Class: E-Prescribe    Route: Topical    calcium carbonate (OS-KYLE) 1500 (600 Ca) MG tablet            Sig: Take by mouth 2 times daily (with meals)    Class: Historical    Route: Oral    Multiple Vitamin (MULTI-DAY PO)            Class: Historical    Route: Oral            Allergies   Allergen Reactions     Nkda [No Known Drug Allergies]         ROS : 14 point review of systems was negative except the symptoms listed above in the HPI.    This document serves as a record of the services and decisions personally performed and made by Wandy Watson MD and was created by Mike Enamorado, a trained medical scribe, based on personal observations and provider statements to the medical scribe.  January 7, 2020 4:36 PM   Mike Enamorado    OBJECTIVE:                                                    GENERAL: healthy, alert and no distress  SKIN: Cronin Skin Type - II.  Arms, legs, fingers were examined. The dermatoscope was used to help evaluate pigmented lesions.  Skin Pertinent Findings:  Some lightly erythematous scaly plaques on the knees  and elbows.     Diagnostic Test Results:  No results found for this or any previous visit (from the past 24 hour(s)).      ASSESSMENT:                                                      Encounter Diagnosis   Name Primary?     Plaque psoriasis      MDM: Good control with prn use of the topical steroid     PLAN:                                                    Patient Instructions   FUTURE APPOINTMENTS  Follow up in 1 year(s).    Continue applying moisturizer regularly.  Continue taking a Vitamin D supplement every day    OVER-THE-COUNTER (OTC) SUPPLEMENTS  Take by mouth a supplement of Vitamin D3 3771-4293 IU/day.    TOPICAL STEROID INSTRUCTIONS  Continue using augmented betamethasone dipropionate (Diprolene) 0.05% ointment infrequently as needed for control of symptoms.      TT : 20 minutes.  CT : 15 minutes. Discussion regarding etiology, spectrum of psoriasis, treatment options, aggravating factors.    The information in this document, created by the medical scribe for me, accurately reflects the services I personally performed and the decisions made by me. I have reviewed and approved this document for accuracy prior to leaving the patient care area.  January 7, 2020 4:34 PM  Wandy Watson MD  [facility] [place]

## 2020-01-07 NOTE — LETTER
1/7/2020         RE: Isaura Bhagat  [address]  [place] [address]        Dear Colleague,    [facility] Please see a copy of my visit note below.    [facility] - PRIMARY CARE SKIN    CC : Psoriasis   SUBJECTIVE:                                                    Isaura Bhagat is a 39 year old female who presents to clinic today for follow-up of psoriasis on the elbows and left knee.     Symptoms have been well controlled. She requests a refill of augmented betamethasone dipropionate 0.05% ointment on the elbows and knees. Calcipotriene was never picked up, because augmented betamethasone dipropionate provided satisfactory control of symptoms. She is using Diprolene approximately once weekly. Previous treatment with fluocinonide 0.05% ointment did not give her good control of symptoms.    She does not report any aching or sore joints at this time.    Personal Medical History  Skin Cancer : NO  Eczema Psoriasis Rosacea Sensitive Skin Autoimmune   NO YES NO NO NO     Family Medical History  Skin Cancer : NO  Eczema Psoriasis Rosacea Sensitive Skin Autoimmune   NO NO NO NO NO   Arthritis : YES.    Patient Active Problem List   Diagnosis     Psoriasis     CARDIOVASCULAR SCREENING; LDL GOAL LESS THAN 160     Coagulopathy (H)     Rotator cuff tendonitis, right     Shoulder pain, right       Past Medical History:   Diagnosis Date     Bleeding disorder (H)      Gastroesophageal reflux disease      Hearing problem      Psoriasis 3/24/2010     Rotator cuff tendonitis, right 10/7/2016    Past Surgical History:   Procedure Laterality Date     GYN SURGERY  11/72009    Csection     LAPAROSCOPIC TUBAL LIGATION Bilateral 1/30/2015    Procedure: LAPAROSCOPIC TUBAL LIGATION;  Surgeon: Reyes Poe MD;  Location: [facility] OR     NO HISTORY OF SURGERY        Social History     Tobacco Use     Smoking status: Never  Smoker     Smokeless tobacco: Never Used   Substance Use Topics     Alcohol use: Not Currently     Comment: occasionally     Drug use: Never    Family History     Problem (# of Occurrences) Relation (Name,Age of Onset)    Alzheimer Disease (1) Paternal Grandfather    C.A.D. (1) Maternal Grandfather    Cardiovascular (1) Maternal Grandfather    Cerebrovascular Disease (1) Paternal Grandfather    Dementia (1) Paternal Grandmother    Eye Disorder (1) Mother    Gastrointestinal Disease (3) Mother, Father, Brother    Heart Disease (1) Maternal Grandfather    Hypertension (2) Father, Maternal Grandfather    Lipids (3) Maternal Grandfather, Paternal Grandmother, Paternal Grandfather    Obesity (1) Maternal Grandmother    Respiratory (1) Maternal Grandmother           Prescription Medications as of 1/7/2020       Rx Number Disp Refills Start End Last Dispensed Date Next Fill Date Owning Pharmacy    augmented betamethasone dipropionate (DIPROLENE-AF) 0.05 % external ointment  50 g 1 1/7/2020    [facility] [address] [place] [address]    Sig: Apply topically 2 times daily Apply to AA on elbows and knees M-F every other week    Class: E-Prescribe    Route: Topical    calcium carbonate (OS-KYLE) 1500 (600 Ca) MG tablet            Sig: Take by mouth 2 times daily (with meals)    Class: Historical    Route: Oral    Multiple Vitamin (MULTI-DAY PO)            Class: Historical    Route: Oral            Allergies   Allergen Reactions     Nkda [No Known Drug Allergies]         ROS : 14 point review of systems was negative except the symptoms listed above in the HPI.    This document serves as a record of the services and decisions personally performed and made by Wandy Watson MD and was created by Mike Enamorado, a trained medical scribe, based on personal observations and provider statements to the medical scribe.  January 7, 2020 4:36 PM   Mike Enamorado    OBJECTIVE:                                                     GENERAL: healthy, alert and no distress  SKIN: Cronin Skin Type - II.  Arms, legs, fingers were examined. The dermatoscope was used to help evaluate pigmented lesions.  Skin Pertinent Findings:  Some lightly erythematous scaly plaques on the knees and elbows.     Diagnostic Test Results:  No results found for this or any previous visit (from the past 24 hour(s)).      ASSESSMENT:                                                      Encounter Diagnosis   Name Primary?     Plaque psoriasis      MDM: Good control with prn use of the topical steroid     PLAN:                                                    Patient Instructions   FUTURE APPOINTMENTS  Follow up in 1 year(s).    Continue applying moisturizer regularly.  Continue taking a Vitamin D supplement every day    OVER-THE-COUNTER (OTC) SUPPLEMENTS  Take by mouth a supplement of Vitamin D3 4267-8291 IU/day.    TOPICAL STEROID INSTRUCTIONS  Continue using augmented betamethasone dipropionate (Diprolene) 0.05% ointment infrequently as needed for control of symptoms.      TT : 20 minutes.  CT : 15 minutes. Discussion regarding etiology, spectrum of psoriasis, treatment options, aggravating factors.    The information in this document, created by the medical scribe for me, accurately reflects the services I personally performed and the decisions made by me. I have reviewed and approved this document for accuracy prior to leaving the patient care area.  January 7, 2020 4:34 PM  Wandy Watson MD  [facility] [place]    Again, thank you for allowing me to participate in the care of your patient.        Sincerely,        Wandy Watson MD

## 2020-02-05 ENCOUNTER — OFFICE VISIT (OUTPATIENT)
Dept: ORTHOPEDICS | Facility: CLINIC | Age: 40
End: 2020-02-05
Payer: COMMERCIAL

## 2020-02-05 VITALS
SYSTOLIC BLOOD PRESSURE: 117 MMHG | WEIGHT: 119 LBS | BODY MASS INDEX: 21.9 KG/M2 | HEIGHT: 62 IN | DIASTOLIC BLOOD PRESSURE: 68 MMHG

## 2020-02-05 DIAGNOSIS — M75.81 ROTATOR CUFF TENDONITIS, RIGHT: Primary | ICD-10-CM

## 2020-02-05 PROCEDURE — 20610 DRAIN/INJ JOINT/BURSA W/O US: CPT | Mod: RT | Performed by: FAMILY MEDICINE

## 2020-02-05 PROCEDURE — 99203 OFFICE O/P NEW LOW 30 MIN: CPT | Mod: 25 | Performed by: FAMILY MEDICINE

## 2020-02-05 RX ADMIN — LIDOCAINE HYDROCHLORIDE 4 ML: 10 INJECTION, SOLUTION INFILTRATION; PERINEURAL at 13:22

## 2020-02-05 RX ADMIN — LIDOCAINE HYDROCHLORIDE 5 ML: 10 INJECTION, SOLUTION INFILTRATION; PERINEURAL at 13:22

## 2020-02-05 RX ADMIN — TRIAMCINOLONE ACETONIDE 40 MG: 40 INJECTION, SUSPENSION INTRA-ARTICULAR; INTRAMUSCULAR at 13:22

## 2020-02-05 ASSESSMENT — MIFFLIN-ST. JEOR: SCORE: 1163.03

## 2020-02-05 NOTE — PROGRESS NOTES
Musculoskeletal Problem        [facility]   Follow-up Visit s Feb 5, 2020    PCP: Nelly Dupree      Subjective:  Isaura is a 39 year old female who is seen in follow up for evaluation of   Chief Complaint   Patient presents with     Right Shoulder - Pain     Her last visit was on 10/4/16.  Since that time, symptoms have returned. Isaura Bhagat is accompanied today by self.     Patient had a right shoulder: subacromial space Cortisone injection on 10/4/16 that provided 100% relief, lasting for 3.5 years.   She noticed her symptoms return on Sunday after walking. Pain is located right posterior shoulder. Also radiating pain down her arm and into her thumb.   Patient has noticed improved symptoms with injection treatment.    Patient is using no aids.    Patient denies any new injuries.      Location of Pain: right shoulder     prior cortisone injection with Dr. Cruz at [facility]  Treatment so far consists of: corticosteroid injection (most recent date: March 2016) that provided  4 month(s) of relief      Social History: works at nurse, [employer] wound care RN     Past Medical History:   Diagnosis Date     Bleeding disorder (H)      Gastroesophageal reflux disease      Hearing problem      Psoriasis 3/24/2010     Rotator cuff tendonitis, right 10/7/2016       Patient Active Problem List    Diagnosis Date Noted     Coagulopathy (H) 01/25/2012     Priority: High     Saw hematology 2012 - they said elevated factor viii on it's own is normal.  No worries, no need for follow up.       Rotator cuff tendonitis, right 10/07/2016     Priority: Medium     Shoulder pain, right 10/07/2016     Priority: Medium     Psoriasis 03/24/2010     Priority: Medium     CARDIOVASCULAR SCREENING; LDL GOAL LESS THAN 160 10/31/2010     Priority: Low       Family History   Problem Relation Age of Onset     Eye Disorder Mother      Gastrointestinal Disease Mother      Hypertension Father      Gastrointestinal Disease  "Father      Obesity Maternal Grandmother      Respiratory Maternal Grandmother      C.AJOCELYN. Maternal Grandfather      Hypertension Maternal Grandfather      Cardiovascular Maternal Grandfather      Heart Disease Maternal Grandfather      Lipids Maternal Grandfather      Lipids Paternal Grandmother      Dementia Paternal Grandmother      Cerebrovascular Disease Paternal Grandfather      Alzheimer Disease Paternal Grandfather      Lipids Paternal Grandfather      Gastrointestinal Disease Brother        Social History     Social History     Marital Status:      Spouse Name: N/A     Number of Children: N/A     Years of Education: N/A     Social History Main Topics     Smoking status: Never Smoker      Smokeless tobacco: Never Used     Alcohol Use: Yes      Comment: occasionally     Drug Use: No     Past Surgical History:   Procedure Laterality Date     GYN SURGERY  11/72009    Csection     LAPAROSCOPIC TUBAL LIGATION Bilateral 1/30/2015    Procedure: LAPAROSCOPIC TUBAL LIGATION;  Surgeon: Reyes Poe MD;  Location: [facility] OR     NO HISTORY OF SURGERY           Review of Systems   Musculoskeletal: Positive for joint pain.   All other systems reviewed and are negative.        Physical Exam  /68   Ht 1.575 m (5' 2\")   Wt 54 kg (119 lb)   BMI 21.77 kg/m    Constitutional:well-developed, well-nourished, and in no distress.   Cardiovascular: Intact distal pulses.    Neurological: alert. Gait normal.   Skin: Skin is warm and dry.   Psychiatric: Mood and affect normal.   Respiratory: unlabored, speaks in full sentences  Lymph: no LAD, no lymphangitis    Right Shoulder Exam     Tenderness   None    Range of Motion   Active Abduction:                       Normal  Passive Abduction:                    Normal  Extension:                                  Normal  Forward Flexion:                        180  External Rotation:                      90  Internal Rotation 0 degrees:      Normal  Internal Rotation 90 " degrees:    Normal    Muscle Strength   Abduction:            5/5  Internal Rotation:  5/5  External Rotation: 5/5  Supraspinatus:     5/5  Subscapularis:     5/5  Biceps:                 5/5    Tests   Impingement:   Positive  Munguia:          Positive  Cross Arm:      Negative  Drop Arm:        Positive  Apprehension: Negative  Sulcus:            Negative    Comments:  Painful arc of motion. Markedly painful at end range of full motion.         Recent Results (from the past 744 hour(s))   MR Shoulder Right w/o Contrast    Narrative    MR SHOULDER RIGHT WITHOUT CONTRAST  10/1/2016 9:20 AM    HISTORY:  Right shoulder pain, weakness, and mechanical locking.  Remote history of trauma. Pain in right shoulder.    TECHNIQUE:  Coronal oblique T1, T2, and fat suppressed T2, sagittal  oblique T2, and transverse proton density and T2 weighted images.    FINDINGS:   Osseous acromial outlet: There is a Type I subacromial configuration.  No apparent subacromial spur. The acromioclavicular joint appears  within normal limits with no indentation of the supraspinatus outlet.    Rotator cuff:  There is mild-to-moderate supraspinatus tendon  thickening with mild intrasubstance intermediate signal intensity  which persists on the T2-weighted images suggesting moderate  tendinosis. Mild subscapularis tendinosis is also demonstrated. There  is no evidence of bala rotator cuff tear, tendon rupture, or tendon  retraction. No rotator cuff muscle atrophy.    Labral Structures: The glenoid labrum appears within normal limits. No  apparent SLAP lesion. No paralabral cysts.    Biceps Tendon: No long head biceps tendon tear, subluxation, or  tendinosis.    Osseous structures:  Marrow signal about the shoulder is within normal  limits.    Joint space: No glenohumeral joint effusion.    Additional findings:  No abnormal bursal signal. No deltoid muscle  edema.       Impression    IMPRESSION: Supraspinatus moderate tendinosis and subscapularis  mild  tendinosis. No bala rotator cuff tear.    SAGRARIO DOMINGUEZ MD         ICD-10-CM    1. Rotator cuff tendonitis, right M75.81 Large Joint Injection/Arthocentesis: R subacromial bursa     Recurrence of symptoms, last treated and evaluated over 3 years ago, now with recent recurrence, unknown trigger, but she has not continue physical therapy.  Medial scapular border prominence is noted, and I reiterated her rehab exercises emphasizing scapular stabilization.  Offered return to therapy and she felt comfortable resuming her prior workouts which I encouraged on a lifelong basis 2-3 times weekly.  With discussion regarding concerns over repeated injections, she was comfortable proceeding, but if symptoms return, I would recommend return to formal physical therapy.  Patient comfortable with plan.        Large Joint Injection/Arthocentesis: R subacromial bursa  Date/Time: 2/5/2020 1:22 PM  Performed by: Tj Jeronimo MD  Authorized by: Tj Jeronimo MD     Indications:  Pain  Needle Size:  25 G  Guidance: landmark guided    Approach:  Posterolateral  Location:  Shoulder      Site:  R subacromial bursa  Medications:  5 mL lidocaine 1 %; 4 mL lidocaine 1 %; 40 mg triamcinolone 40 MG/ML  Outcome:  Tolerated well, no immediate complications  Procedure discussed: discussed risks, benefits, and alternatives    Consent Given by:  Patient  Timeout: timeout called immediately prior to procedure    Prep: patient was prepped and draped in usual sterile fashion

## 2020-02-05 NOTE — LETTER
2/5/2020         RE: Isaura Bhagat  [address]  [place] [address]        Dear Colleague,    Thank you for referring your patient, Isaura Bhagat, to the  [facility]. Please see a copy of my visit note below.    Musculoskeletal Problem        [facility]   Follow-up Visit s Feb 5, 2020    PCP: Nelly Dupree      Subjective:  Isaura is a 39 year old female who is seen in follow up for evaluation of   Chief Complaint   Patient presents with     Right Shoulder - Pain     Her last visit was on 10/4/16.  Since that time, symptoms have returned. Isaura Bhagat is accompanied today by self.     Patient had a right shoulder: subacromial space Cortisone injection on 10/4/16 that provided 100% relief, lasting for 3.5 years.   She noticed her symptoms return on Sunday after walking. Pain is located right posterior shoulder. Also radiating pain down her arm and into her thumb.   Patient has noticed improved symptoms with injection treatment.    Patient is using no aids.    Patient denies any new injuries.      Location of Pain: right shoulder     prior cortisone injection with Dr. Cruz at [facility]  Treatment so far consists of: corticosteroid injection (most recent date: March 2016) that provided  4 month(s) of relief      Social History: works at nurse, [employer] wound care RN     Past Medical History:   Diagnosis Date     Bleeding disorder (H)      Gastroesophageal reflux disease      Hearing problem      Psoriasis 3/24/2010     Rotator cuff tendonitis, right 10/7/2016       Patient Active Problem List    Diagnosis Date Noted     Coagulopathy (H) 01/25/2012     Priority: High     Saw hematology 2012 - they said elevated factor viii on it's own is normal.  No worries, no need for follow up.       Rotator cuff tendonitis, right 10/07/2016     Priority: Medium     Shoulder pain, right 10/07/2016     Priority: Medium     Psoriasis 03/24/2010     Priority: Medium      "CARDIOVASCULAR SCREENING; LDL GOAL LESS THAN 160 10/31/2010     Priority: Low       Family History   Problem Relation Age of Onset     Eye Disorder Mother      Gastrointestinal Disease Mother      Hypertension Father      Gastrointestinal Disease Father      Obesity Maternal Grandmother      Respiratory Maternal Grandmother      C.A.D. Maternal Grandfather      Hypertension Maternal Grandfather      Cardiovascular Maternal Grandfather      Heart Disease Maternal Grandfather      Lipids Maternal Grandfather      Lipids Paternal Grandmother      Dementia Paternal Grandmother      Cerebrovascular Disease Paternal Grandfather      Alzheimer Disease Paternal Grandfather      Lipids Paternal Grandfather      Gastrointestinal Disease Brother        Social History     Social History     Marital Status:      Spouse Name: N/A     Number of Children: N/A     Years of Education: N/A     Social History Main Topics     Smoking status: Never Smoker      Smokeless tobacco: Never Used     Alcohol Use: Yes      Comment: occasionally     Drug Use: No     Past Surgical History:   Procedure Laterality Date     GYN SURGERY  11/72009    Csection     LAPAROSCOPIC TUBAL LIGATION Bilateral 1/30/2015    Procedure: LAPAROSCOPIC TUBAL LIGATION;  Surgeon: Reyes Poe MD;  Location: [facility] OR     NO HISTORY OF SURGERY           Review of Systems   Musculoskeletal: Positive for joint pain.   All other systems reviewed and are negative.        Physical Exam  /68   Ht 1.575 m (5' 2\")   Wt 54 kg (119 lb)   BMI 21.77 kg/m     Constitutional:well-developed, well-nourished, and in no distress.   Cardiovascular: Intact distal pulses.    Neurological: alert. Gait normal.   Skin: Skin is warm and dry.   Psychiatric: Mood and affect normal.   Respiratory: unlabored, speaks in full sentences  Lymph: no LAD, no lymphangitis    Right Shoulder Exam     Tenderness   None    Range of Motion   Active Abduction:                       " Normal  Passive Abduction:                    Normal  Extension:                                  Normal  Forward Flexion:                        180  External Rotation:                      90  Internal Rotation 0 degrees:      Normal  Internal Rotation 90 degrees:    Normal    Muscle Strength   Abduction:            5/5  Internal Rotation:  5/5  External Rotation: 5/5  Supraspinatus:     5/5  Subscapularis:     5/5  Biceps:                 5/5    Tests   Impingement:   Positive  Munguia:          Positive  Cross Arm:      Negative  Drop Arm:        Positive  Apprehension: Negative  Sulcus:            Negative    Comments:  Painful arc of motion. Markedly painful at end range of full motion.         Recent Results (from the past 744 hour(s))   MR Shoulder Right w/o Contrast    Narrative    MR SHOULDER RIGHT WITHOUT CONTRAST  10/1/2016 9:20 AM    HISTORY:  Right shoulder pain, weakness, and mechanical locking.  Remote history of trauma. Pain in right shoulder.    TECHNIQUE:  Coronal oblique T1, T2, and fat suppressed T2, sagittal  oblique T2, and transverse proton density and T2 weighted images.    FINDINGS:   Osseous acromial outlet: There is a Type I subacromial configuration.  No apparent subacromial spur. The acromioclavicular joint appears  within normal limits with no indentation of the supraspinatus outlet.    Rotator cuff:  There is mild-to-moderate supraspinatus tendon  thickening with mild intrasubstance intermediate signal intensity  which persists on the T2-weighted images suggesting moderate  tendinosis. Mild subscapularis tendinosis is also demonstrated. There  is no evidence of bala rotator cuff tear, tendon rupture, or tendon  retraction. No rotator cuff muscle atrophy.    Labral Structures: The glenoid labrum appears within normal limits. No  apparent SLAP lesion. No paralabral cysts.    Biceps Tendon: No long head biceps tendon tear, subluxation, or  tendinosis.    Osseous structures:  Marrow  signal about the shoulder is within normal  limits.    Joint space: No glenohumeral joint effusion.    Additional findings:  No abnormal bursal signal. No deltoid muscle  edema.       Impression    IMPRESSION: Supraspinatus moderate tendinosis and subscapularis mild  tendinosis. No bala rotator cuff tear.    SAGRARIO DOMINGUEZ MD         ICD-10-CM    1. Rotator cuff tendonitis, right M75.81 Large Joint Injection/Arthocentesis: R subacromial bursa     Recurrence of symptoms, last treated and evaluated over 3 years ago, now with recent recurrence, unknown trigger, but she has not continue physical therapy.  Medial scapular border prominence is noted, and I reiterated her rehab exercises emphasizing scapular stabilization.  Offered return to therapy and she felt comfortable resuming her prior workouts which I encouraged on a lifelong basis 2-3 times weekly.  With discussion regarding concerns over repeated injections, she was comfortable proceeding, but if symptoms return, I would recommend return to formal physical therapy.  Patient comfortable with plan.        Large Joint Injection/Arthocentesis: R subacromial bursa  Date/Time: 2/5/2020 1:22 PM  Performed by: Tj Jeronimo MD  Authorized by: Tj Jeronimo MD     Indications:  Pain  Needle Size:  25 G  Guidance: landmark guided    Approach:  Posterolateral  Location:  Shoulder      Site:  R subacromial bursa  Medications:  5 mL lidocaine 1 %; 4 mL lidocaine 1 %; 40 mg triamcinolone 40 MG/ML  Outcome:  Tolerated well, no immediate complications  Procedure discussed: discussed risks, benefits, and alternatives    Consent Given by:  Patient  Timeout: timeout called immediately prior to procedure    Prep: patient was prepped and draped in usual sterile fashion            Again, thank you for allowing me to participate in the care of your patient.        Sincerely,        Tj Jeronimo MD

## 2020-02-05 NOTE — PATIENT INSTRUCTIONS
Post procedure instructions      Most people can return to normal work following procedures    Do not soak in a hot tub, lake or swimming pool for 48 hours    It is ok to shower    The lidocaine (what is giving you pain relief right now) will likely stop working in 1-2 hours.  You will then have pain again; similar to before you received the injection. The corticosteroid may take up to 2 weeks to take full effect.    Ice today if you have pain at the injection site.     It is OK to do your normal activity; vigorous exercise is not recommended.     Post injection instructions given. Watch for any sign of infection: Red, hot to touch and swollen, call or return immediately if so.     Ice twice today for 20 minutes each time and three times tomorrow (20 minutes each session) for symptom management.     OTC meds for pain only as needed (Tylenol extra strength-500mg )    It is common to have facial flushing for a few days. You can also occasionally experience a feeling of restlessness for a few days after the injection. Any other concerns, give us a call.    Follow up with PCP for general medical issues.     The injection you received today may take 7-10 days to begin to reduce your pain and swelling.    Cannot have a repeat steroid injection any sooner than 3 months.     If you are a known diabetic, make sure to increase the frequency you check your blood sugars. Adjust accordingly. Steroid injections will increase your blood sugars for about 1 week.     Follow up if needed. Call direct clinic number [[phone]] at any time with questions or concerns.

## 2020-02-05 NOTE — PROGRESS NOTES
"[facility]   Clinic Visit s 2020    PCP: Nelly Dupree Adalgisa Delarosa is a 39 year old female who is seen as self referral for   Chief Complaint   Patient presents with     Right Shoulder - Pain       Injury: {Precipitating Event:717684::\"Patient reports insidious onset without acute precipitating event.\"}     {HAND DOMINANCE:369184}    Location of Pain: {AJ side:627308} {AJ Location Joints:671886} {AJ loc on limb:650441}, {AJ radiating pain:265281}   Duration of Pain: {ACUTE/CHRONIC:325679}, *** {DAY/WEEK/MONTH/YEAR:371621},   Rating of Pain at worst: {PAIN SCALE:499732}  Rating of Pain Currently: {PAIN SCALE:768354}  Pain is better with: {Pain (activities that relieve):303212}   Pain is worse with: {Worsenin}  Treatment so far consists of: {AJ treatment options:187814}  Associated symptoms: {AJassociatedSX:672250}  Recent imaging completed: {Imagin}.  Prior History of related problems: ***    Social History: {Work history:104586}     Past Medical History:   Diagnosis Date     Bleeding disorder (H)      Gastroesophageal reflux disease      Hearing problem      Psoriasis 3/24/2010     Rotator cuff tendonitis, right 10/7/2016       Patient Active Problem List    Diagnosis Date Noted     Coagulopathy (H) 2012     Priority: High     Saw hematology  - they said elevated factor viii on it's own is normal.  No worries, no need for follow up.       Rotator cuff tendonitis, right 10/07/2016     Priority: Medium     Shoulder pain, right 10/07/2016     Priority: Medium     Psoriasis 2010     Priority: Medium     CARDIOVASCULAR SCREENING; LDL GOAL LESS THAN 160 10/31/2010     Priority: Low       Family History   Problem Relation Age of Onset     Eye Disorder Mother      Gastrointestinal Disease Mother      Hypertension Father      Gastrointestinal Disease Father      Obesity Maternal Grandmother      Respiratory Maternal Grandmother      C.A.D. Maternal " Grandfather      Hypertension Maternal Grandfather      Cardiovascular Maternal Grandfather      Heart Disease Maternal Grandfather      Lipids Maternal Grandfather      Lipids Paternal Grandmother      Dementia Paternal Grandmother      Cerebrovascular Disease Paternal Grandfather      Alzheimer Disease Paternal Grandfather      Lipids Paternal Grandfather      Gastrointestinal Disease Brother        Social History     Socioeconomic History     Marital status:      Spouse name: Not on file     Number of children: Not on file     Years of education: Not on file     Highest education level: Not on file   Occupational History     Not on file   Social Needs     Financial resource strain: Not on file     Food insecurity:     Worry: Not on file     Inability: Not on file     Transportation needs:     Medical: Not on file     Non-medical: Not on file   Tobacco Use     Smoking status: Never Smoker     Smokeless tobacco: Never Used   Substance and Sexual Activity     Alcohol use: Not Currently     Comment: occasionally     Drug use: Never     Sexual activity: Yes     Partners: Male     Birth control/protection: Pill, Female Surgical   Lifestyle     Physical activity:     Days per week: Not on file     Minutes per session: Not on file     Stress: Not on file   Relationships     Social connections:     Talks on phone: Not on file     Gets together: Not on file     Attends Church service: Not on file     Active member of club or organization: Not on file     Attends meetings of clubs or organizations: Not on file     Relationship status: Not on file     Intimate partner violence:     Fear of current or ex partner: Not on file     Emotionally abused: Not on file     Physically abused: Not on file     Forced sexual activity: Not on file   Other Topics Concern     Parent/sibling w/ CABG, MI or angioplasty before 65F 55M? Not Asked   Social History Narrative     Not on file       Past Surgical History:   Procedure  Laterality Date     GYN SURGERY  11/72009    Csection     LAPAROSCOPIC TUBAL LIGATION Bilateral 1/30/2015    Procedure: LAPAROSCOPIC TUBAL LIGATION;  Surgeon: Reyes Poe MD;  Location:  OR     NO HISTORY OF SURGERY             ROS      Physical Exam      Ortho Exam  X-ray images {ORDERED/DEFERRED:995938} and independently reviewed by me in the office today with the patient. X-ray shows:   No results found for this or any previous visit (from the past 744 hour(s)).

## 2020-02-06 RX ORDER — LIDOCAINE HYDROCHLORIDE 10 MG/ML
5 INJECTION, SOLUTION INFILTRATION; PERINEURAL
Status: SHIPPED | OUTPATIENT
Start: 2020-02-05

## 2020-02-06 RX ORDER — LIDOCAINE HYDROCHLORIDE 10 MG/ML
4 INJECTION, SOLUTION INFILTRATION; PERINEURAL
Status: SHIPPED | OUTPATIENT
Start: 2020-02-05

## 2020-02-06 RX ORDER — TRIAMCINOLONE ACETONIDE 40 MG/ML
40 INJECTION, SUSPENSION INTRA-ARTICULAR; INTRAMUSCULAR
Status: SHIPPED | OUTPATIENT
Start: 2020-02-05

## 2020-05-18 DIAGNOSIS — L40.9 PSORIASIS: Primary | ICD-10-CM

## 2020-05-18 RX ORDER — ME-TETRAHYDROFOLATE/B12/HRB236 1-1-500 MG
1 CAPSULE ORAL DAILY
Qty: 90 CAPSULE | Refills: 6 | Status: SHIPPED | OUTPATIENT
Start: 2020-05-18

## 2020-08-25 ENCOUNTER — HOSPITAL ENCOUNTER (OUTPATIENT)
Dept: MAMMOGRAPHY | Facility: CLINIC | Age: 40
Discharge: HOME | End: 2020-08-25
Attending: INTERNAL MEDICINE | Admitting: INTERNAL MEDICINE
Payer: COMMERCIAL

## 2020-08-25 DIAGNOSIS — Z12.31 VISIT FOR SCREENING MAMMOGRAM: ICD-10-CM

## 2020-08-25 PROCEDURE — 77067 SCR MAMMO BI INCL CAD: CPT

## 2020-11-17 ENCOUNTER — ANCILLARY PROCEDURE (OUTPATIENT)
Dept: GENERAL RADIOLOGY | Facility: CLINIC | Age: 40
End: 2020-11-17
Attending: PODIATRIST
Payer: COMMERCIAL

## 2020-11-17 ENCOUNTER — OFFICE VISIT (OUTPATIENT)
Dept: PODIATRY | Facility: CLINIC | Age: 40
End: 2020-11-17
Payer: COMMERCIAL

## 2020-11-17 VITALS
SYSTOLIC BLOOD PRESSURE: 114 MMHG | DIASTOLIC BLOOD PRESSURE: 74 MMHG | BODY MASS INDEX: 22.28 KG/M2 | HEIGHT: 61 IN | WEIGHT: 118 LBS

## 2020-11-17 DIAGNOSIS — M21.619 BUNION: ICD-10-CM

## 2020-11-17 DIAGNOSIS — M21.619 BUNION: Primary | ICD-10-CM

## 2020-11-17 PROCEDURE — 99203 OFFICE O/P NEW LOW 30 MIN: CPT | Performed by: PODIATRIST

## 2020-11-17 PROCEDURE — 73630 X-RAY EXAM OF FOOT: CPT | Mod: RT | Performed by: RADIOLOGY

## 2020-11-17 ASSESSMENT — MIFFLIN-ST. JEOR: SCORE: 1137.62

## 2020-11-17 NOTE — LETTER
11/17/2020         RE: Isaura Bhagat  [address] [facility] [id number]        Dear Colleague,    Thank you for referring your patient, Isaura Bhagat, to the [facility]. Please see a copy of my visit note below.    PATIENT HISTORY:  Isuara Bhagat is a 40 year old female who presents to clinic for b/l foot bunions, R more painful than L.  1 year duration.  Worse with walking.  Reports pain after a long walk.  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  She never had an x-ray at the time.    Review of Systems:  Patient denies fever, chills, rash, wound, stiffness, limping, numbness, weakness, heart burn, blood in stool, chest pain with activity, calf pain when walking, shortness of breath with activity, chronic cough, easy bleeding/bruising, swelling of ankles, excessive thirst, fatigue, depression, anxiety.     PAST MEDICAL HISTORY:   Past Medical History:   Diagnosis Date     Bleeding disorder (H)      Gastroesophageal reflux disease      Hearing problem      Psoriasis 3/24/2010     Rotator cuff tendonitis, right 10/7/2016        PAST SURGICAL HISTORY:   Past Surgical History:   Procedure Laterality Date     GYN SURGERY  11/72009    Csection     LAPAROSCOPIC TUBAL LIGATION Bilateral 1/30/2015    Procedure: LAPAROSCOPIC TUBAL LIGATION;  Surgeon: Reyes Poe MD;  Location:  OR     NO HISTORY OF SURGERY          MEDICATIONS:   Current Outpatient Medications:      calcium carbonate (OS-KYLE) 1500 (600 Ca) MG tablet, Take by mouth 2 times daily (with meals), Disp: , Rfl:      Multiple Vitamin (MULTI-DAY PO), , Disp: , Rfl:      augmented betamethasone dipropionate (DIPROLENE-AF) 0.05 % external ointment, Apply topically 2 times daily Apply to AA on elbows and knees M-F every other week (Patient not taking: Reported on 11/17/2020), Disp: 50 g, Rfl: 1     Dietary Management Product (RHEUMATE) CAPS, Take 1  tablet by mouth daily, Disp: 90 capsule, Rfl: 6    Current Facility-Administered Medications:      lidocaine 1 % injection 4 mL, 4 mL, , , Tj Jeronimo MD, 4 mL at 02/05/20 1322     lidocaine 1 % injection 5 mL, 5 mL, , , Tj Jeronimo MD, 5 mL at 02/05/20 1322     triamcinolone (KENALOG-40) injection 40 mg, 40 mg, , , Tj Jeronimo MD, 40 mg at 02/05/20 1322     ALLERGIES:    Allergies   Allergen Reactions     Nkda [No Known Drug Allergies]         SOCIAL HISTORY:   Social History     Socioeconomic History     Marital status:      Spouse name: Not on file     Number of children: Not on file     Years of education: Not on file     Highest education level: Not on file   Occupational History     Not on file   Social Needs     Financial resource strain: Not on file     Food insecurity     Worry: Not on file     Inability: Not on file     Transportation needs     Medical: Not on file     Non-medical: Not on file   Tobacco Use     Smoking status: Never Smoker     Smokeless tobacco: Never Used   Substance and Sexual Activity     Alcohol use: Not Currently     Comment: occasionally     Drug use: Never     Sexual activity: Yes     Partners: Male     Birth control/protection: Pill, Female Surgical   Lifestyle     Physical activity     Days per week: Not on file     Minutes per session: Not on file     Stress: Not on file   Relationships     Social connections     Talks on phone: Not on file     Gets together: Not on file     Attends Christian service: Not on file     Active member of club or organization: Not on file     Attends meetings of clubs or organizations: Not on file     Relationship status: Not on file     Intimate partner violence     Fear of current or ex partner: Not on file     Emotionally abused: Not on file     Physically abused: Not on file     Forced sexual activity: Not on file   Other Topics Concern     Parent/sibling w/ CABG, MI or angioplasty before 65F 55M? Not  "Asked   Social History Narrative     Not on file        FAMILY HISTORY:   Family History   Problem Relation Age of Onset     Eye Disorder Mother      Gastrointestinal Disease Mother      Hypertension Father      Gastrointestinal Disease Father      Obesity Maternal Grandmother      Respiratory Maternal Grandmother      C.A.D. Maternal Grandfather      Hypertension Maternal Grandfather      Cardiovascular Maternal Grandfather      Heart Disease Maternal Grandfather      Lipids Maternal Grandfather      Lipids Paternal Grandmother      Dementia Paternal Grandmother      Cerebrovascular Disease Paternal Grandfather      Alzheimer Disease Paternal Grandfather      Lipids Paternal Grandfather      Gastrointestinal Disease Brother         EXAM:Vitals: /74   Ht 1.549 m (5' 1\")   Wt 53.5 kg (118 lb)   BMI 22.30 kg/m    BMI= Body mass index is 22.3 kg/m .    General appearance: Patient is alert and fully cooperative with history & exam.  No sign of distress is noted during the visit.     Psychiatric: Affect is pleasant & appropriate.  Patient appears motivated to improve health.     Respiratory: Breathing is regular & unlabored while sitting.     HEENT: Hearing is intact to spoken word.  Speech is clear.  No gross evidence of visual impairment that would impact ambulation.     Dermatologic: Skin is intact to both feet without significant lesions, rash or abrasion.  No paronychia or evidence of soft tissue infection is noted.     Vascular: DP & PT pulses are intact & regular bilaterally.  No significant edema or varicosities noted.  CFT and skin temperature are normal to both lower extremities.     Neurologic: Lower extremity sensation is intact to light touch.  No evidence of weakness or contracture in the lower extremities.  No evidence of neuropathy.     Musculoskeletal: Bunions noted b/l, moreso on R.  Lateral deviation of 1st toes.  Some limitation of R 1st MPJ with loading of the 1st metatarsal.  Pt notes pain " to dorsal base of great toe, medial 1st metatarsal head on the R.  Some loss of arch height with standing.  Patient is ambulatory without assistive device or brace.  No gross ankle deformity noted.  No foot or ankle joint effusion is noted.    XRs of foot reviewed with pt.  No acute findings.  Mildly increased 1st IM angle.  Perhaps some early degenerative changes at the 1st MPJ.     ASSESSMENT:   R foot painful bunion, functional hallux limitus     PLAN:  Reviewed patient's chart in epic.  Discussed condition and treatment options including pros and cons.    We discussed treatment alternatives regarding bunion pain and deformity.  Discussed likely some level of functional hallux limitus given exam and location of pain.  This could lead to further degenerative change at the 1st MPJ.    Non-operative treatments were discussed including wide fitting shoes, splints, pads and orthotics.  Wide fitting shoes are likely the most useful non-surgical treatment.  I would not expect much improvement from injection or bunion night splints.      Surgical options briefly reviewed.  Pt is not interested in surgery at this time.    Advised stiffer soled, wide shoes, otc orthotics such as superfeet, NSAIDs prn.  Offered custom orthotics, pt deferred for now.    All questions answered.    F/u prn.    Sandro Oliver DPM, FACFAS            Again, thank you for allowing me to participate in the care of your patient.        Sincerely,        Sandro Oliver DPM

## 2020-11-17 NOTE — PROGRESS NOTES
PATIENT HISTORY:  Isaura Bhagat is a 40 year old female who presents to clinic for b/l foot bunions, R more painful than L.  1 year duration.  Worse with walking.  Reports pain after a long walk.  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  She never had an x-ray at the time.    Review of Systems:  Patient denies fever, chills, rash, wound, stiffness, limping, numbness, weakness, heart burn, blood in stool, chest pain with activity, calf pain when walking, shortness of breath with activity, chronic cough, easy bleeding/bruising, swelling of ankles, excessive thirst, fatigue, depression, anxiety.     PAST MEDICAL HISTORY:   Past Medical History:   Diagnosis Date     Bleeding disorder (H)      Gastroesophageal reflux disease      Hearing problem      Psoriasis 3/24/2010     Rotator cuff tendonitis, right 10/7/2016        PAST SURGICAL HISTORY:   Past Surgical History:   Procedure Laterality Date     GYN SURGERY  11/72009    Csection     LAPAROSCOPIC TUBAL LIGATION Bilateral 1/30/2015    Procedure: LAPAROSCOPIC TUBAL LIGATION;  Surgeon: Reyes Poe MD;  Location:  OR     NO HISTORY OF SURGERY          MEDICATIONS:   Current Outpatient Medications:      calcium carbonate (OS-KYLE) 1500 (600 Ca) MG tablet, Take by mouth 2 times daily (with meals), Disp: , Rfl:      Multiple Vitamin (MULTI-DAY PO), , Disp: , Rfl:      augmented betamethasone dipropionate (DIPROLENE-AF) 0.05 % external ointment, Apply topically 2 times daily Apply to AA on elbows and knees M-F every other week (Patient not taking: Reported on 11/17/2020), Disp: 50 g, Rfl: 1     Dietary Management Product (RHEUMATE) CAPS, Take 1 tablet by mouth daily, Disp: 90 capsule, Rfl: 6    Current Facility-Administered Medications:      lidocaine 1 % injection 4 mL, 4 mL, , , Tj Jeronimo MD, 4 mL at 02/05/20 1322     lidocaine 1 % injection 5 mL, 5 mL, , , Tj Jeronimo MD,  5 mL at 02/05/20 1322     triamcinolone (KENALOG-40) injection 40 mg, 40 mg, , , Tj Jeronimo MD, 40 mg at 02/05/20 1322     ALLERGIES:    Allergies   Allergen Reactions     Nkda [No Known Drug Allergies]         SOCIAL HISTORY:   Social History     Socioeconomic History     Marital status:      Spouse name: Not on file     Number of children: Not on file     Years of education: Not on file     Highest education level: Not on file   Occupational History     Not on file   Social Needs     Financial resource strain: Not on file     Food insecurity     Worry: Not on file     Inability: Not on file     Transportation needs     Medical: Not on file     Non-medical: Not on file   Tobacco Use     Smoking status: Never Smoker     Smokeless tobacco: Never Used   Substance and Sexual Activity     Alcohol use: Not Currently     Comment: occasionally     Drug use: Never     Sexual activity: Yes     Partners: Male     Birth control/protection: Pill, Female Surgical   Lifestyle     Physical activity     Days per week: Not on file     Minutes per session: Not on file     Stress: Not on file   Relationships     Social connections     Talks on phone: Not on file     Gets together: Not on file     Attends Congregation service: Not on file     Active member of club or organization: Not on file     Attends meetings of clubs or organizations: Not on file     Relationship status: Not on file     Intimate partner violence     Fear of current or ex partner: Not on file     Emotionally abused: Not on file     Physically abused: Not on file     Forced sexual activity: Not on file   Other Topics Concern     Parent/sibling w/ CABG, MI or angioplasty before 65F 55M? Not Asked   Social History Narrative     Not on file        FAMILY HISTORY:   Family History   Problem Relation Age of Onset     Eye Disorder Mother      Gastrointestinal Disease Mother      Hypertension Father      Gastrointestinal Disease Father      Obesity  "Maternal Grandmother      Respiratory Maternal Grandmother      DEION.A.D. Maternal Grandfather      Hypertension Maternal Grandfather      Cardiovascular Maternal Grandfather      Heart Disease Maternal Grandfather      Lipids Maternal Grandfather      Lipids Paternal Grandmother      Dementia Paternal Grandmother      Cerebrovascular Disease Paternal Grandfather      Alzheimer Disease Paternal Grandfather      Lipids Paternal Grandfather      Gastrointestinal Disease Brother         EXAM:Vitals: /74   Ht 1.549 m (5' 1\")   Wt 53.5 kg (118 lb)   BMI 22.30 kg/m    BMI= Body mass index is 22.3 kg/m .    General appearance: Patient is alert and fully cooperative with history & exam.  No sign of distress is noted during the visit.     Psychiatric: Affect is pleasant & appropriate.  Patient appears motivated to improve health.     Respiratory: Breathing is regular & unlabored while sitting.     HEENT: Hearing is intact to spoken word.  Speech is clear.  No gross evidence of visual impairment that would impact ambulation.     Dermatologic: Skin is intact to both feet without significant lesions, rash or abrasion.  No paronychia or evidence of soft tissue infection is noted.     Vascular: DP & PT pulses are intact & regular bilaterally.  No significant edema or varicosities noted.  CFT and skin temperature are normal to both lower extremities.     Neurologic: Lower extremity sensation is intact to light touch.  No evidence of weakness or contracture in the lower extremities.  No evidence of neuropathy.     Musculoskeletal: Bunions noted b/l, moreso on R.  Lateral deviation of 1st toes.  Some limitation of R 1st MPJ with loading of the 1st metatarsal.  Pt notes pain to dorsal base of great toe, medial 1st metatarsal head on the R.  Some loss of arch height with standing.  Patient is ambulatory without assistive device or brace.  No gross ankle deformity noted.  No foot or ankle joint effusion is noted.    XRs of foot " reviewed with pt.  No acute findings.  Mildly increased 1st IM angle.  Perhaps some early degenerative changes at the 1st MPJ.     ASSESSMENT:   R foot painful bunion, functional hallux limitus     PLAN:  Reviewed patient's chart in epic.  Discussed condition and treatment options including pros and cons.    We discussed treatment alternatives regarding bunion pain and deformity.  Discussed likely some level of functional hallux limitus given exam and location of pain.  This could lead to further degenerative change at the 1st MPJ.    Non-operative treatments were discussed including wide fitting shoes, splints, pads and orthotics.  Wide fitting shoes are likely the most useful non-surgical treatment.  I would not expect much improvement from injection or bunion night splints.      Surgical options briefly reviewed.  Pt is not interested in surgery at this time.    Advised stiffer soled, wide shoes, otc orthotics such as superfeet, NSAIDs prn.  Offered custom orthotics, pt deferred for now.    All questions answered.    F/u prn.    Sandro Oliver DPM, FACFAS

## 2020-11-17 NOTE — PATIENT INSTRUCTIONS
Thank you for choosing [facility] / [facility]le Surgery!    DR. FRASER'S CLINIC LOCATIONS:     [place] [facility] UP SURGERY: [phone]   [address]   APPOINTMENTS: [phone]   [place] [address] BILLING Q's: [phone]  [phone] TRIAGE RN:  [phone]       [place]    [address]    [place] [address] [phone]  [phone]        Follow up: As needed    Diagnosis: Bunion's & Hallux Limitus      Please read through the following handouts and if you have any questions, please feel free to call us or send a [employer] message!    BUNION (HALLUX ABDUCTO VALGUS)  A bunion is caused by muscle imbalance. The great toe is pulled toward the smaller toes. The metatarsal head is pushed outward creating a lump on the side of your foot. Imbalance is the result of foot structure and instability.   Bunions do not improve with time. They usually enlarge, however this is a fairly slow process. Shoes do not necessarily cause bunions, however, they can hasten development and definitely cause bunions to hurt.   Bunions often run in families. We inherit a certain foot structure, which may be predisposed to bunion development.   Bunion pain is likely a combination of shoes rubbing on the bump, nerve irritation, compression between the toes, joint misalignment, arthritis and altered gait.   SYMPTOMS   Bunions are usually termed mild, moderate or severe. Just because you have a bunion does not mean you have to have pain. There are some people with very severe bunions and no pain and people with mild bunions and a lot of pain.   - Pain on the inside of your foot at the big toe joint (1st MTPJ)   - Swelling on the inside of your foot at the big toe joint   - Redness on the inside of your foot at the big toe joint   - Numbness or burning in the big toe (hallux)   - Decreased motion at the big toe joint   - Painful bursa (fluid-filled sac) on the inside of  your foot at the big toe joint   - Pain while wearing shoes -especially shoes too narrow or with high heels    - Pain during activities   - Corn in between the big toe and second toe   - Callous formation on the side or bottom of the big toe or big toe joint   - Callous under the second toe joint (2nd MTPJ)   - Pain in the second toe joint   TREATMENT  Conservative (non-surgical) treatment will not make the bunion go away, but it will hopefully decrease the signs and symptoms you have and help you get rid of the pain and get you back to your activities.   1.  Wider shoes or extra depth shoes: Most bunion pain can be improved simply by wearing compatible shoes. People with bunions cannot choose footwear simply because they like the style. Your bunion should determine which shoes are to be worn. Wide shoes with nonirritating seams,soft leather and a square toe box are most compatible with a bunion. Shoes should fit appropriately right out of the box but may need to be professionally stretched and modified to accommodate the bump. Heels, dress shoes and shoes with pointed toes will not be comfortable.   2. NSAIDs   3.  Arch supports, custom inserts, padding, splints, toe spacers : Most bunion pain can be improved simply by wearing compatible shoes. People with bunions cannot choose footwear simply because they like the style. Your bunion should determine which shoes are to be worn. Wide shoes with nonirritating seams,soft leather and a square toe box are most compatible with a bunion. Shoes should fit appropriately right out of the box but may need to be professionally stretched and modified to accommodate the bump. Heels, dress shoes and shoes with pointed toes will not be comfortable.   4.  Change activities   5.  Physical therapy  SURGERY  Surgical treatment for bunions is sometimes needed. If you are limited by pain, cannot fit in shoes comfortably and are not able to do your daily activities then surgery may be a  good option for you. There are many different surgical procedures to repair bunions. Your foot and ankle surgeon will review your foot exam findings, your x-rays, your age, your health, your lifestyle, your physical activity level and discuss with you which procedure he or she would recommend. Surgical procedures for bunions range from soft tissue repair to cutting and realigning the bones. It is not recommended that you have bunion surgery for cosmetic reasons (you do not like how your foot looks) or because you want to fit in a certain pair of shoes; There is the risk that even after surgery, the bunion will reoccur 9-10% of the time.   Bunion surgery involves cutting and repositioning the bones surrounding the bunion. Pins and screws are used to hold the bones in place during the healing process. The goal of bunion surgery is to reduce the size of the bunion bump. Realignment of the toe and joint is attempted.     Some first toes cannot be forced back into normal alignment even with surgery. Surgery is helpful in most cases but does not necessarily create a normal foot.   Healing after surgery requires about six weeks of protection. This allows the bone to heal. Maximum recovery takes about one year. The scar tissue and jOint structures require this amount of time to finish the healing process. Expect stiffness, swelling and numbness during that time frame. Bunion surgery does involve side effects. Some side effects are predictable and others are less common but do occur. A scar will be visible and could be irritated by shoes. The shoe may rub on the screw or internal pin requiring surgical removal of these fixation devices. The screw and pin would likely be left in place for a full year. The first toe may loose motion after bunion surgery. The amount of stiffness is variable. Some people never regain normal motion of the first toe. This is due to scar tissue inherent to any surgery. The first toe may drift  toward the second toe or away from the second toe. Spreading of the first and second toes is a rare occurrence after bunion surgery. This can be quite bothersome and would need to be surgically repaired. Toe drift toward the second toe could result in a recurrent bunion and revision surgery. Joint fusion is one option to correct an unstable, drifting toe. This procedure straightens the toe, however, no motion remains. Fusion may be necessary to correct complications of bunion surgery or as the original procedure in severe cases.   All surgical procedures involve risk of infection, numbness, pain, delayed healing, osteotomy dislocation, blood clots, continued foot pain, etc. Bunion surgery is quite complex and should not be taken lightly.   Any skin incision can lead to infection. Deep infection might involve the bone and thus repeat surgery and six weeks of IV antibiotics. Scar tissue can cause nerve pain or numbness. This is generally temporary but can be permanent. We do not have treatments that cure nerve problems. Second toe pain could be related to altered mechanics and pressure transferred to the second toe. Most feet with bunions have pre-existing second toe problems. Delayed bone healing would lengthen the healing time. Some bones simply do not heal. This requires repeat surgery, electronic bone stimulation and/or extended protection. Smokers have an approximate 20% chance of poor bone healing. This is double that of a non-smoker. The bone cut may displace. This may need to be repaired with a second operation. Displacement can cause jOint malalignment. Immobility after surgery can cause blood clots in the legs and lungs. This could result in death.   Foot pain is complex. Most feet hurt for more than one reason. Fixing the bunion would not necessarily create a pain free foot. Appropriate shoes, healthy body weight, avoidance of bare foot walking and moderation of activity will always be necessary to enjoy  foot comfort. Your bunion may involve arthritis, which is incurable even with surgery. Long standing bunions often involve chronic irritation to the surrounding nerves. Nerve pain may not resolve even with reducing the bunion bump since permanent nerve damage may be present   Bunion surgery is nevertheless quite successful. Most surgical patients are pleased with their foot following bunion surgery. Many of the issues described above can be controlled by taking proper care of your foot during the healing process.   Your surgeon would be happy to fully describe any of the above issues. You should pursue a full understanding of the operation,recovery process and any potential problems that could develop.   PREVENTION  1.  Do not wear high heels if there is a family history of bunions.  2.  Wear shoes that have enough width and depth in the toe box  Here are exercises that may benefit people with bunions:   Toe stretches - Stretching out your toes can help keep them limber and offset foot pain. To stretch your toes, point your toes straight ahead for 5 seconds and then curl them under for 5 seconds. Repeat these stretches 10 times. These exercises can be especially beneficial if you also have hammertoes, or chronically bent toes, in addition to a bunion.   Toe flexing and марина - Press your toes against a hard surface such as a wall, to flex and stretch them; hold the position for 10 seconds and repeat three to four times. Then flex your toes in the opposite direction; hold the position for 10 seconds and repeat three to four times.   Stretching your big toe - Using your fingers to gently pull your big toe over into proper alignment can be helpful as well. Hold your toe in position for 10 seconds and repeat three to four times.   Resistance exercises - Wrap either a towel or belt around your big toe and use it to pull your big toe toward you while simultaneously pushing forward, against the towel, with your big  "toe.   Ball roll - To massage the bottom of your foot, sit down, place a golf ball on the floor under your foot, and roll it around under your foot for two minutes. This can help relieve foot strain and cramping.   Towel curls - You can strengthen your toes by spreading out a small towel on the floor, curling your toes around it, and pulling it toward you. Repeat five times. Gripping objects with your toes like this can help keep your foot flexible.   Picking up marbles - Another gripping exercise you can perform to keep your foot flexible is picking up marbles with your toes. Do this by placing 20 marbles on the floor in front of you and use your foot to pick the marbles up one by one and place them in a bowl.   Walking along the beach - Whenever possible, spend time walking on sand. This can give you a gentle foot massage and also help strengthen your toes. This is especially beneficial for people who have arthritis associated with their bunions.     DEGENERATIVE ARTHRITIS OF THE BIG TOE JOINT   (hallux limitus/hallux rigidus)   Arthritis of the joint at the base of the big toe (metatarsophalangeal joint) has several causes. Usually it results from repetitive trauma to the joint, secondary to abnormal foot mechanics. Often it is hereditary. However, a one-time traumatic event can lead to arthritis. The condition doesn't improve with time, and even with treatment, can worsen. The cartilage wears out, joint surfaces are no longer smooth, bone rubs on bone, inflammation occurs with pain, and eventually bone spurs and loose fragments might develop.   The joint is often painful with activity, worse with flimsy shoes or walking barefoot, and it slowly progresses over time. A person might notice the toe \"locking up\" with walking. There often is an obvious, and irritating, bony bump on top of the foot. Shoes might be uncomfortable. In some people the pain is so bothersome that recreational activities sometimes even normal " daily activities are difficult to perform.   The pain from this arthritis is likely a combination of joint jamming, cartilage loss and inflammation, and irritation from shoes rubbing on the bump. Sometimes other parts of the foot, leg, or back hurt from altering one's walk to compensate for the painful joint.     Ways to help a person live with the discomfort include wearing a good, supportive shoe with a rigid, rocker-type bottom. An example is a hiking boot. A rigid sole minimizes bending of the joint, and therefore, joint motion and pain. Shoes with a high toe box allow for less rubbing on the bump. Avoiding barefoot walking, sandals, flip-flops and slippers usually helps.   Sometimes an insert or orthotic provides symptom relief. This might make shoe fit more difficult. Pads over the bump and occasionally injections into the joint provide relief.   Surgery for this condition is aimed towards alleviating pain. It does not cure the arthritis nor does it guarantee better joint motion. Depending on the condition of the metatarsophalangeal joint, there are several surgiqal options:    1.  Cutting off the bony bump(s) and cleaning the joint    2.  Loosening the joint up by making cuts in the first metatarsal bone or the big toe bone and removing a small section of bone.    3.  Repositioning bone to minimize jamming of the joint.    4.  In severe cases, the joint is fused. By fusing the joint, it will never bend again. This resolves the pain, because it's the movement of a worn out joint that causes pain. Oftentimes the operation involves a combination of these procedures and. requires the use of screws, pins, and/or a small surgical plate.     Healing after surgery requires about six weeks of protection. This allows the bone to heal. Maximum recovery takes about one year. The scar tissue and joint structures require this amount of time to finish the healing process. Expect stiffness, swelling and numbness during that  time frame.   Surgery for arthritis of the metatarsophalangeal joint does involve side effects. Some side effects are predictable and others are less common but do occur. A scar will be visible and could be irritated by shoes. The shoe may rub on the screw or internal pin requiring surgical removal of these fixation devices. The screw and pin would likely be left in place for a full year. The first toe may remain stiff after surgery. The amount of stiffness is variable. Most people never regain normal motion of the first toe. This is due to scar tissue inherent to any surgery, in addition to the cumUlative effects of arthritis. Sometimes the big toe drifts to one side or the other. Joint fusion is one option to correct an unstable, drifting toe. This procedure straightens the toe, however, no motion remains.   All surgical procedures involve risk of infection, numbness, pain, delayed bone healing, osteotomy (bone cut) dislocation, blood clots, continued foot pain, etc. Arthritic joint surgery is quite complex and should not be taken lightly.    Any skin incision can lead to infection. Deep infection might involve the bone and thus repeat surgery and six weeks of IV antibiotics. Scar tissue can cause nerve pain or numbness. his is generally temporary but can be permanent. We do not have treatments that cure nerve problems. Second toe pain could be related to altered mechanics and pressure transferred to the second toe. Delayed bone healing would lengthen the healing time. Some bones simply do not heal. This requires repeat surgery, electronic bone stimulation and/or extended protection. Smokers have an approximate 20% chance of poor bone healing. This is double that of a non-smoker. The bone cut may displace. This may need to be repaired with a second operation. Displacement can cause joint malalignment. Immobility after surgery can cause a blood clot in the legs and lungs. This could result in death.   Foot pain is  complex. Most feet hurt for more than one reason. Operating on the arthritic   big toe joint will not necessarily create a pain free foot. Appropriate shoes, healthy body weight, avoidance of bare foot walking and moderation of activity will always be   necessary to enjoy foot comfort. Arthritis is incurable even with surgery.     Surgery for this type of arthritis is nevertheless quite successful. Most surgical patients are pleased with their foot following surgery. Many of the issues described above can be controlled by taking proper care of your foot during the healing process.   Cosmetic bump surgery is discouraged for the reasons listed above. A bump and joint that is comfortable when wearing appropriate shoes should simply be treated with appropriate shoes.   Your surgeon would be happy to fully describe any of the above issues. You should pursue a full understanding of the operation, recovery process and any potential problems that could develop.     OVER THE COUNTER INSERTS    Most of these can be found at your local [employer], [employer], or online:    SuperFeet  Blue or Green  Sofsole Fit [facility]   Power Step   Walk-Fit ([employer])  *For heel pain* Arch Cradles  *For heel pain*       ** A good high quality over the counter insert should cost around $40-$50    ** Capsulitis/Metarasalgia - try adding a metatarsal pad on your inserts or find an insert with one built in

## 2020-11-29 ENCOUNTER — HEALTH MAINTENANCE LETTER (OUTPATIENT)
Age: 40
End: 2020-11-29

## 2021-05-17 NOTE — PROGRESS NOTES
HPI:  Patient complains of chronic dry eyes - patient had tried. Patient has psoriasis. She had dry skin and dry lips. She tried lumify for redness, refresh tears and gel, systane PF, Xiidra (6 months), restasis (6-12 months), and pataday (3 months). No burning. No rad/grittiness.       Pertinent Medical History:    Coagulopathy    Psoriasis    Ocular History:    Dry Eye Syndrome, both.     Eye Medications:    Lumify    Refresh    Systane ultra    Xiidra    Restasis    Pataday    Assessment and Plan:  1.   Meibomitis, both eyes.     Failed lumify for redness, refresh tears and gel, systane PF, Xiidra (6 months), restasis (6-12 months), and pataday (3 months).    Could consider doxycycline.     Could consider lipiflow.     We discussed that inflammatory dry eyes is an inflammatory condition. We can improve symptoms but not get rid of all the symptoms.    Follow up in 3-4 weeks. Patient declined dilation today - discussed risks and benefits. Plan on possible dilated eye exam at follow up.       Patient consented to a dilated eye exam:    No. We discussed risks and benefits of a dilated eye exam.     Medical History:  Past Medical History:   Diagnosis Date     Bleeding disorder (H)      Gastroesophageal reflux disease      Hearing problem      Psoriasis 3/24/2010     Rotator cuff tendonitis, right 10/7/2016       Medications:  Current Outpatient Medications   Medication Sig Dispense Refill     augmented betamethasone dipropionate (DIPROLENE-AF) 0.05 % external ointment Apply topically 2 times daily Apply to AA on elbows and knees M-F every other week (Patient not taking: Reported on 11/17/2020) 50 g 1     calcium carbonate (OS-KYLE) 1500 (600 Ca) MG tablet Take by mouth 2 times daily (with meals)       Dietary Management Product (RHEUMATE) CAPS Take 1 tablet by mouth daily 90 capsule 6     Multiple Vitamin (MULTI-DAY PO)      Complete documentation of historical and exam elements from today's encounter can be found in  the full encounter summary report (not reduplicated in this progress note). I personally obtained the chief complaint(s) and history of present illness.  I confirmed and edited as necessary the review of systems, past medical/surgical history, family history, social history, and examination findings as documented by others; and I examined the patient myself. I personally reviewed the relevant tests, images, and reports as documented above. I formulated and edited as necessary the assessment and plan and discussed the findings and management plan with the patient and family. - Anabell Green, OD

## 2021-05-19 ENCOUNTER — OFFICE VISIT (OUTPATIENT)
Dept: OPHTHALMOLOGY | Facility: CLINIC | Age: 41
End: 2021-05-19
Attending: OPTOMETRIST
Payer: COMMERCIAL

## 2021-05-19 DIAGNOSIS — H00.026 MEIBOMITIS, LEFT: ICD-10-CM

## 2021-05-19 DIAGNOSIS — H00.023 MEIBOMITIS, RIGHT: Primary | ICD-10-CM

## 2021-05-19 PROCEDURE — 99203 OFFICE O/P NEW LOW 30 MIN: CPT | Performed by: OPTOMETRIST

## 2021-05-19 ASSESSMENT — CONF VISUAL FIELD
OS_NORMAL: 1
OD_NORMAL: 1

## 2021-05-19 ASSESSMENT — VISUAL ACUITY
OS_CC: 20/20
METHOD: SNELLEN - LINEAR
OD_CC: 20/20

## 2021-05-19 ASSESSMENT — EXTERNAL EXAM - LEFT EYE: OS_EXAM: NORMAL

## 2021-05-19 ASSESSMENT — SLIT LAMP EXAM - LIDS
COMMENTS: 4+ MEIBOMIAN GLAND DYSFUNCTION
COMMENTS: 4+ MEIBOMIAN GLAND DYSFUNCTION

## 2021-05-19 ASSESSMENT — TONOMETRY
OS_IOP_MMHG: 12
IOP_METHOD: TONOPEN
OD_IOP_MMHG: 13

## 2021-05-19 ASSESSMENT — EXTERNAL EXAM - RIGHT EYE: OD_EXAM: NORMAL

## 2021-05-19 NOTE — PATIENT INSTRUCTIONS
1. Ocusoft lid wipes, 2 times daily, both eyes.     2. Warm compress with dry eye mask, 10 minutes, 2 times daily, both eyes.     3. Express oil glands gently, 2 times daily, both eyes.     4. Systane balance, 4 times daily, both eyes.

## 2021-08-26 ENCOUNTER — HOSPITAL ENCOUNTER (OUTPATIENT)
Dept: MAMMOGRAPHY | Facility: CLINIC | Age: 41
Discharge: HOME | End: 2021-08-26
Attending: INTERNAL MEDICINE | Admitting: INTERNAL MEDICINE
Payer: COMMERCIAL

## 2021-08-26 DIAGNOSIS — Z12.31 VISIT FOR SCREENING MAMMOGRAM: ICD-10-CM

## 2021-08-26 PROCEDURE — 77063 BREAST TOMOSYNTHESIS BI: CPT

## 2021-09-25 ENCOUNTER — HEALTH MAINTENANCE LETTER (OUTPATIENT)
Age: 41
End: 2021-09-25

## 2022-01-15 ENCOUNTER — HEALTH MAINTENANCE LETTER (OUTPATIENT)
Age: 42
End: 2022-01-15

## 2022-12-26 ENCOUNTER — HEALTH MAINTENANCE LETTER (OUTPATIENT)
Age: 42
End: 2022-12-26

## 2023-04-16 ENCOUNTER — HEALTH MAINTENANCE LETTER (OUTPATIENT)
Age: 43
End: 2023-04-16

## 2023-12-06 NOTE — TELEPHONE ENCOUNTER
FUTURE VISIT INFORMATION      FUTURE VISIT INFORMATION:    Date: 4/26/19    Time: 3:00 pm    [facility]  REFERRAL INFORMATION:    Referring provider:  Dr. Nelly Dupree MD    Referring providers clinic:  [facility]    Reason for visit/diagnosis  Ear Fullness        RECORDS REQUESTED FROM:       Clinic name Comments Records Status Imaging Status   [facility]3/12/19 Dr. Dupree Epic                                      
Handoff

## 2024-02-04 ENCOUNTER — HEALTH MAINTENANCE LETTER (OUTPATIENT)
Age: 44
End: 2024-02-04

## 2024-06-23 ENCOUNTER — HEALTH MAINTENANCE LETTER (OUTPATIENT)
Age: 44
End: 2024-06-23